# Patient Record
Sex: FEMALE | Race: BLACK OR AFRICAN AMERICAN | Employment: UNEMPLOYED | URBAN - METROPOLITAN AREA
[De-identification: names, ages, dates, MRNs, and addresses within clinical notes are randomized per-mention and may not be internally consistent; named-entity substitution may affect disease eponyms.]

---

## 2017-02-22 ENCOUNTER — HOSPITAL ENCOUNTER (OUTPATIENT)
Dept: GENERAL RADIOLOGY | Age: 82
Discharge: HOME | End: 2017-02-22
Payer: MEDICARE

## 2017-02-22 DIAGNOSIS — K59.00 CONSTIPATION: ICD-10-CM

## 2017-02-22 DIAGNOSIS — K56.609 INTESTINAL OBSTRUCTION (HCC): ICD-10-CM

## 2017-02-22 PROCEDURE — 74020 XR ABD FLAT/ ERECT: CPT

## 2017-03-27 ENCOUNTER — HOSPITAL ENCOUNTER (OUTPATIENT)
Dept: CT IMAGING | Age: 82
Discharge: HOME | End: 2017-03-27
Attending: FAMILY MEDICINE
Payer: MEDICARE

## 2017-03-27 DIAGNOSIS — R63.4 WEIGHT LOSS: ICD-10-CM

## 2017-03-27 DIAGNOSIS — R10.11 ABDOMINAL PAIN, RIGHT UPPER QUADRANT: ICD-10-CM

## 2017-03-27 LAB — CREAT UR-MCNC: 1.1 MG/DL (ref 0.6–1.3)

## 2017-03-27 PROCEDURE — 74011636320 HC RX REV CODE- 636/320

## 2017-03-27 PROCEDURE — 74177 CT ABD & PELVIS W/CONTRAST: CPT

## 2017-03-27 PROCEDURE — 82565 ASSAY OF CREATININE: CPT

## 2017-03-27 RX ADMIN — IOPAMIDOL 80 ML: 612 INJECTION, SOLUTION INTRAVENOUS at 15:00

## 2017-04-05 ENCOUNTER — HOSPITAL ENCOUNTER (OUTPATIENT)
Age: 82
Discharge: HOME | End: 2017-04-05
Attending: FAMILY MEDICINE
Payer: MEDICARE

## 2017-04-05 DIAGNOSIS — R93.5 ABNORMAL CT OF THE ABDOMEN: ICD-10-CM

## 2017-04-05 LAB — CREAT UR-MCNC: 1.5 MG/DL (ref 0.6–1.3)

## 2017-04-05 PROCEDURE — 74183 MRI ABD W/O CNTR FLWD CNTR: CPT

## 2017-04-05 PROCEDURE — A9585 GADOBUTROL INJECTION: HCPCS

## 2017-04-05 PROCEDURE — 74011250636 HC RX REV CODE- 250/636

## 2017-04-05 PROCEDURE — 82565 ASSAY OF CREATININE: CPT

## 2017-04-05 RX ADMIN — GADOBUTROL 10 ML: 604.72 INJECTION INTRAVENOUS at 15:00

## 2017-05-31 ENCOUNTER — HOSPITAL ENCOUNTER (OUTPATIENT)
Age: 82
Setting detail: OBSERVATION
Discharge: HOSPICE | End: 2017-06-02
Attending: EMERGENCY MEDICINE | Admitting: INTERNAL MEDICINE
Payer: MEDICARE

## 2017-05-31 ENCOUNTER — APPOINTMENT (OUTPATIENT)
Dept: GENERAL RADIOLOGY | Age: 82
End: 2017-05-31
Attending: EMERGENCY MEDICINE
Payer: MEDICARE

## 2017-05-31 DIAGNOSIS — E86.0 DEHYDRATION: Primary | ICD-10-CM

## 2017-05-31 DIAGNOSIS — R53.81 DEBILITY: ICD-10-CM

## 2017-05-31 DIAGNOSIS — C79.9 METASTATIC CARCINOMA (HCC): ICD-10-CM

## 2017-05-31 DIAGNOSIS — R63.4 WEIGHT LOSS: ICD-10-CM

## 2017-05-31 DIAGNOSIS — C79.9 ADENOCARCINOMA, METASTATIC (HCC): ICD-10-CM

## 2017-05-31 DIAGNOSIS — R41.82 ALTERED MENTAL STATUS, UNSPECIFIED: ICD-10-CM

## 2017-05-31 DIAGNOSIS — R62.7 FAILURE TO THRIVE IN ADULT: ICD-10-CM

## 2017-05-31 LAB
ALBUMIN SERPL BCP-MCNC: 3.2 G/DL (ref 3.4–5)
ALBUMIN/GLOB SERPL: 0.6 {RATIO} (ref 0.8–1.7)
ALP SERPL-CCNC: 98 U/L (ref 45–117)
ALT SERPL-CCNC: 10 U/L (ref 13–56)
ANION GAP BLD CALC-SCNC: 9 MMOL/L (ref 3–18)
AST SERPL W P-5'-P-CCNC: 23 U/L (ref 15–37)
BASOPHILS # BLD AUTO: 0 K/UL (ref 0–0.1)
BASOPHILS # BLD: 0 % (ref 0–2)
BILIRUB DIRECT SERPL-MCNC: 0.3 MG/DL (ref 0–0.2)
BILIRUB SERPL-MCNC: 0.6 MG/DL (ref 0.2–1)
BUN SERPL-MCNC: 36 MG/DL (ref 7–18)
BUN/CREAT SERPL: 27 (ref 12–20)
CALCIUM SERPL-MCNC: 10.9 MG/DL (ref 8.5–10.1)
CHLORIDE SERPL-SCNC: 99 MMOL/L (ref 100–108)
CO2 SERPL-SCNC: 27 MMOL/L (ref 21–32)
CREAT SERPL-MCNC: 1.32 MG/DL (ref 0.6–1.3)
DIFFERENTIAL METHOD BLD: ABNORMAL
EOSINOPHIL # BLD: 0 K/UL (ref 0–0.4)
EOSINOPHIL NFR BLD: 0 % (ref 0–5)
ERYTHROCYTE [DISTWIDTH] IN BLOOD BY AUTOMATED COUNT: 17.4 % (ref 11.6–14.5)
GLOBULIN SER CALC-MCNC: 5.3 G/DL (ref 2–4)
GLUCOSE SERPL-MCNC: 95 MG/DL (ref 74–99)
HCT VFR BLD AUTO: 31.9 % (ref 35–45)
HGB BLD-MCNC: 11.1 G/DL (ref 12–16)
INR PPP: 1.3 (ref 0.8–1.2)
LIPASE SERPL-CCNC: 111 U/L (ref 73–393)
LYMPHOCYTES # BLD AUTO: 6 % (ref 21–52)
LYMPHOCYTES # BLD: 0.8 K/UL (ref 0.9–3.6)
MAGNESIUM SERPL-MCNC: 2.4 MG/DL (ref 1.6–2.6)
MCH RBC QN AUTO: 24.4 PG (ref 24–34)
MCHC RBC AUTO-ENTMCNC: 34.8 G/DL (ref 31–37)
MCV RBC AUTO: 70.1 FL (ref 74–97)
MONOCYTES # BLD: 0.8 K/UL (ref 0.05–1.2)
MONOCYTES NFR BLD AUTO: 6 % (ref 3–10)
NEUTS SEG # BLD: 11.7 K/UL (ref 1.8–8)
NEUTS SEG NFR BLD AUTO: 88 % (ref 40–73)
PLATELET # BLD AUTO: 108 K/UL (ref 135–420)
PLATELET COMMENTS,PCOM: ABNORMAL
POTASSIUM SERPL-SCNC: 4.8 MMOL/L (ref 3.5–5.5)
PROT SERPL-MCNC: 8.5 G/DL (ref 6.4–8.2)
PROTHROMBIN TIME: 15.9 SEC (ref 11.5–15.2)
RBC # BLD AUTO: 4.55 M/UL (ref 4.2–5.3)
RBC MORPH BLD: ABNORMAL
RBC MORPH BLD: ABNORMAL
SODIUM SERPL-SCNC: 135 MMOL/L (ref 136–145)
WBC # BLD AUTO: 13.3 K/UL (ref 4.6–13.2)

## 2017-05-31 PROCEDURE — 80076 HEPATIC FUNCTION PANEL: CPT | Performed by: EMERGENCY MEDICINE

## 2017-05-31 PROCEDURE — 83690 ASSAY OF LIPASE: CPT | Performed by: EMERGENCY MEDICINE

## 2017-05-31 PROCEDURE — 74011250636 HC RX REV CODE- 250/636: Performed by: EMERGENCY MEDICINE

## 2017-05-31 PROCEDURE — 82728 ASSAY OF FERRITIN: CPT | Performed by: INTERNAL MEDICINE

## 2017-05-31 PROCEDURE — 85025 COMPLETE CBC W/AUTO DIFF WBC: CPT | Performed by: EMERGENCY MEDICINE

## 2017-05-31 PROCEDURE — 85610 PROTHROMBIN TIME: CPT | Performed by: EMERGENCY MEDICINE

## 2017-05-31 PROCEDURE — 71010 XR CHEST PORT: CPT

## 2017-05-31 PROCEDURE — 84443 ASSAY THYROID STIM HORMONE: CPT | Performed by: INTERNAL MEDICINE

## 2017-05-31 PROCEDURE — 80048 BASIC METABOLIC PNL TOTAL CA: CPT | Performed by: EMERGENCY MEDICINE

## 2017-05-31 PROCEDURE — 93005 ELECTROCARDIOGRAM TRACING: CPT

## 2017-05-31 PROCEDURE — 82607 VITAMIN B-12: CPT | Performed by: INTERNAL MEDICINE

## 2017-05-31 PROCEDURE — 83735 ASSAY OF MAGNESIUM: CPT | Performed by: EMERGENCY MEDICINE

## 2017-05-31 PROCEDURE — 82746 ASSAY OF FOLIC ACID SERUM: CPT | Performed by: INTERNAL MEDICINE

## 2017-05-31 PROCEDURE — 83540 ASSAY OF IRON: CPT | Performed by: INTERNAL MEDICINE

## 2017-05-31 PROCEDURE — 99284 EMERGENCY DEPT VISIT MOD MDM: CPT

## 2017-05-31 RX ORDER — SODIUM CHLORIDE 9 MG/ML
150 INJECTION, SOLUTION INTRAVENOUS CONTINUOUS
Status: DISCONTINUED | OUTPATIENT
Start: 2017-05-31 | End: 2017-06-01

## 2017-05-31 RX ADMIN — SODIUM CHLORIDE 150 ML/HR: 900 INJECTION, SOLUTION INTRAVENOUS at 23:20

## 2017-05-31 NOTE — IP AVS SNAPSHOT
Rachell Sveta 
 
 
 [address] Patient: Tenzin Mahmood MRN: [id number] FQX:[id number]/[id number] You are allergic to the following No active allergies Recent Documentation Height Weight BMI Smoking Status 1.676 m 59.9 kg 21.31 kg/m2 Never Smoker Emergency Contacts Name Discharge Info Relation Home Work Mobile Mohini Hernandez (Grandson/Primary Family Contact)  Other Relative [6]   [phone] González Hernandez DECLINED CAREGIVER [4] Spouse [3] [phone] About your hospitalization You were admitted on:  June 1, 2017 You last received care in the:  [facility] [address] [facility] You were discharged on:  June 2, 2017 Unit phone number:  [phone] Why you were hospitalized Your primary diagnosis was: Failure To Thrive In Adult Your diagnoses also included:  Weight Loss, Adenocarcinoma, Metastatic (Hcc), Dehydration, Htn (Hypertension), Hld (Hyperlipidemia), Josue (Acute Kidney Injury) (Hcc), Dementia, Nausea & Vomiting, Altered Mental Status, Unspecified, Debility Providers Seen During Your Hospitalizations Provider Role Specialty Primary office phone Blas Logan MD Attending Provider Emergency Medicine [phone] Re Torres MD Attending Provider Internal Medicine [phone] Your Primary Care Physician (PCP) Primary Care Physician Office Phone Office Fax Kilo Stevie [phone] Follow-up Information Follow up With Details Comments Contact Info Luiz Cannon MD On 6/7/2017 @11:30 [address] [facility] [address] 
[phone] Current Discharge Medication List  
  
START taking these medications Dose & Instructions Dispensing Information Comments Morning Noon Evening Bedtime LORazepam 2 mg/mL concentrated solution Commonly known as:  LORazepam Intensol Your last dose was: Your next dose is:    
   
   
 Dose:  1 mg Take 0.5 mL by mouth every four (4) hours as needed for Agitation or Anxiety (oral or sublingual). Max Daily Amount: 6 mg. Quantity:  30 mL Refills:  0  
     
   
   
   
  
 morphine 100 mg/5 mL (20 mg/mL) concentrated solution Commonly known as:  Mariana Coreas Your last dose was: Your next dose is:    
   
   
 Dose:  5 mg Take 0.25 mL by mouth every two (2) hours as needed for Pain (pain or shortness of breath). Max Daily Amount: 60 mg.  
 Quantity:  30 mL Refills:  0  
     
   
   
   
  
 ondansetron 4 mg disintegrating tablet Commonly known as:  ZOFRAN ODT Your last dose was: Your next dose is:    
   
   
 Dose:  4 mg Take 1 Tab by mouth every six (6) hours as needed for Nausea. Quantity:  30 Tab Refills:  0  
     
   
   
   
  
 therapeutic multivitamin tablet Commonly known as:  [facility] Your last dose was: Your next dose is:    
   
   
 Dose:  1 Tab Take 1 Tab by mouth daily. Quantity:  30 Tab Refills:  0 STOP taking these medications CRESTOR 10 mg tablet Generic drug:  rosuvastatin MICARDIS 20 mg tablet Generic drug:  telmisartan Where to Get Your Medications Information on where to get these meds will be given to you by the nurse or doctor. ! Ask your nurse or doctor about these medications LORazepam 2 mg/mL concentrated solution  
 morphine 100 mg/5 mL (20 mg/mL) concentrated solution  
 ondansetron 4 mg disintegrating tablet  
 therapeutic multivitamin tablet Discharge Instructions Discharge Instructions Patient: Halima Quinn MRN: [id number]  CSN: [id number] YOB: 1935  Age: 80 y.o. Sex: female DOA: 5/31/2017 LOS:  LOS: 0 days   Discharge Date: DIET:  Regular Diet ACTIVITY: Activity as tolerated Home health care for hospice care ADDITIONAL INFORMATION: If you experience any of the following symptoms but not limited to Fever, chills, nausea, vomiting, diarrhea, change in mentation, falling, bleeding, shortness of breath, chest pain, please call your primary care physician or return to the emergency room if you cannot get hold of your doctor:  
 
FOLLOW UP CARE: 
Dr. Candace Schaffer MD as needed. Aleks Dee MD 
6/2/2017 11:52 AM 
 
 
 
 
 
Discharge Orders None [facility] Announcement We are excited to announce that we are making your provider's discharge notes available to you in [facility]. You will see these notes when they are completed and signed by the physician that discharged you from your recent hospital stay. If you have any questions or concerns about any information you see in [facility], please call the Health Information Department where you were seen or reach out to your Primary Care Provider for more information about your plan of care. Introducing [facility]! [employer] introduces [employer] [facility] patient portal. Now you can access parts of your medical record, email your doctor's office, and request medication refills online. 1. In your internet browser, go to [email or web address] 2. Click on the First Time User? Click Here link in the Sign In box. You will see the New Member Sign Up page. 3. Enter your [facility] Access Code exactly as it appears below. You will not need to use this code after youve completed the sign-up process. If you do not sign up before the expiration date, you must request a new code. · [facility] Access Code: [email or web address] Expires: 8/21/2017  4:11 PM 
 
4. Enter the last four digits of your Social Security Number (xxxx) and Date of Birth (mm/dd/yyyy) as indicated and click Submit. You will be taken to the next sign-up page. 5. Create a [facility] ID.  This will be your [facility] login ID and cannot be changed, so think of one that is secure and easy to remember. 6. Create a [employer] password. You can change your password at any time. 7. Enter your Password Reset Question and Answer. This can be used at a later time if you forget your password. 8. Enter your e-mail address. You will receive e-mail notification when new information is available in [address] 9. Click Sign Up. You can now view and download portions of your medical record. 10. Click the Download Summary menu link to download a portable copy of your medical information. If you have questions, please visit the Frequently Asked Questions section of the [employer] website. Remember, [employer] is NOT to be used for urgent needs. For medical emergencies, dial 911. Now available from your iPhone and Android! General Information Please provide this summary of care documentation to your next provider. Patient Signature:  ____________________________________________________________ Date:  ____________________________________________________________  
  
[facility] Provider Signature:  ____________________________________________________________ Date:  ____________________________________________________________

## 2017-05-31 NOTE — IP AVS SNAPSHOT
Current Discharge Medication List  
  
START taking these medications Dose & Instructions Dispensing Information Comments Morning Noon Evening Bedtime LORazepam 2 mg/mL concentrated solution Commonly known as:  LORazepam Intensol Your last dose was: Your next dose is:    
   
   
 Dose:  1 mg Take 0.5 mL by mouth every four (4) hours as needed for Agitation or Anxiety (oral or sublingual). Max Daily Amount: 6 mg. Quantity:  30 mL Refills:  0  
     
   
   
   
  
 morphine 100 mg/5 mL (20 mg/mL) concentrated solution Commonly known as:  Jaspal Magallon Your last dose was: Your next dose is:    
   
   
 Dose:  5 mg Take 0.25 mL by mouth every two (2) hours as needed for Pain (pain or shortness of breath). Max Daily Amount: 60 mg.  
 Quantity:  30 mL Refills:  0  
     
   
   
   
  
 ondansetron 4 mg disintegrating tablet Commonly known as:  ZOFRAN ODT Your last dose was: Your next dose is:    
   
   
 Dose:  4 mg Take 1 Tab by mouth every six (6) hours as needed for Nausea. Quantity:  30 Tab Refills:  0  
     
   
   
   
  
 therapeutic multivitamin tablet Commonly known as:  [facility] Your last dose was: Your next dose is:    
   
   
 Dose:  1 Tab Take 1 Tab by mouth daily. Quantity:  30 Tab Refills:  0 STOP taking these medications CRESTOR 10 mg tablet Generic drug:  rosuvastatin MICARDIS 20 mg tablet Generic drug:  telmisartan Where to Get Your Medications Information on where to get these meds will be given to you by the nurse or doctor. ! Ask your nurse or doctor about these medications LORazepam 2 mg/mL concentrated solution  
 morphine 100 mg/5 mL (20 mg/mL) concentrated solution  
 ondansetron 4 mg disintegrating tablet  
 therapeutic multivitamin tablet

## 2017-05-31 NOTE — Clinical Note
Patient Class[de-identified] Observation [951] Type of Bed: Medical [8] Reason for Observation: Dehydration, metastatic carcinoma, unknown primary Admitting Physician: Arnulfo Spain [[id number]] Attending Physician: Arnulfo Spain [[id number]] Diagnosis: dehydration.

## 2017-05-31 NOTE — ED NOTES
PT resting comfortably on stretcher with family members at bedside. Per pt she does not have any complaints at this time and feels ok. Will continue to monitor.

## 2017-05-31 NOTE — ED TRIAGE NOTES
Pt arrives to ED via EMS. Per EMS, pt has been at home in bed x3 weeks with poor eating habits. Pt was to be set up on hospice care today and hospice requested that pt be transported here for evaluation.  is at bedside.

## 2017-06-01 PROBLEM — N17.9 AKI (ACUTE KIDNEY INJURY) (HCC): Status: ACTIVE | Noted: 2017-06-01

## 2017-06-01 PROBLEM — C79.9 ADENOCARCINOMA, METASTATIC (HCC): Status: ACTIVE | Noted: 2017-06-01

## 2017-06-01 PROBLEM — R63.4 WEIGHT LOSS: Status: ACTIVE | Noted: 2017-06-01

## 2017-06-01 PROBLEM — E78.5 HLD (HYPERLIPIDEMIA): Status: ACTIVE | Noted: 2017-06-01

## 2017-06-01 PROBLEM — R11.2 NAUSEA & VOMITING: Status: ACTIVE | Noted: 2017-06-01

## 2017-06-01 PROBLEM — R62.7 FAILURE TO THRIVE IN ADULT: Status: ACTIVE | Noted: 2017-06-01

## 2017-06-01 PROBLEM — F03.90 DEMENTIA (HCC): Status: ACTIVE | Noted: 2017-06-01

## 2017-06-01 PROBLEM — E86.0 DEHYDRATION: Status: ACTIVE | Noted: 2017-06-01

## 2017-06-01 PROBLEM — I10 HTN (HYPERTENSION): Status: ACTIVE | Noted: 2017-06-01

## 2017-06-01 LAB
ALBUMIN SERPL BCP-MCNC: 2.6 G/DL (ref 3.4–5)
ALBUMIN/GLOB SERPL: 0.6 {RATIO} (ref 0.8–1.7)
ALP SERPL-CCNC: 78 U/L (ref 45–117)
ALT SERPL-CCNC: 9 U/L (ref 13–56)
ANION GAP BLD CALC-SCNC: 5 MMOL/L (ref 3–18)
APPEARANCE UR: CLEAR
AST SERPL W P-5'-P-CCNC: 20 U/L (ref 15–37)
ATRIAL RATE: 103 BPM
BACTERIA URNS QL MICRO: NEGATIVE /HPF
BILIRUB SERPL-MCNC: 0.7 MG/DL (ref 0.2–1)
BILIRUB UR QL: ABNORMAL
BUN SERPL-MCNC: 36 MG/DL (ref 7–18)
BUN/CREAT SERPL: 26 (ref 12–20)
CALCIUM SERPL-MCNC: 10.1 MG/DL (ref 8.5–10.1)
CALCULATED P AXIS, ECG09: 7 DEGREES
CALCULATED R AXIS, ECG10: -41 DEGREES
CALCULATED T AXIS, ECG11: 47 DEGREES
CHLORIDE SERPL-SCNC: 102 MMOL/L (ref 100–108)
CO2 SERPL-SCNC: 30 MMOL/L (ref 21–32)
COLOR UR: ABNORMAL
CREAT SERPL-MCNC: 1.37 MG/DL (ref 0.6–1.3)
DIAGNOSIS, 93000: NORMAL
EPITH CASTS URNS QL MICRO: NORMAL /LPF (ref 0–5)
FERRITIN SERPL-MCNC: 1284 NG/ML (ref 8–388)
FOLATE SERPL-MCNC: 6.8 NG/ML (ref 3.1–17.5)
GLOBULIN SER CALC-MCNC: 4.4 G/DL (ref 2–4)
GLUCOSE BLD STRIP.AUTO-MCNC: 103 MG/DL (ref 70–110)
GLUCOSE BLD STRIP.AUTO-MCNC: 103 MG/DL (ref 70–110)
GLUCOSE BLD STRIP.AUTO-MCNC: 109 MG/DL (ref 70–110)
GLUCOSE BLD STRIP.AUTO-MCNC: 121 MG/DL (ref 70–110)
GLUCOSE SERPL-MCNC: 134 MG/DL (ref 74–99)
GLUCOSE UR STRIP.AUTO-MCNC: NEGATIVE MG/DL
HGB UR QL STRIP: NEGATIVE
HYALINE CASTS URNS QL MICRO: NORMAL /LPF (ref 0–2)
IRON SATN MFR SERPL: 15 %
IRON SERPL-MCNC: 30 UG/DL (ref 50–175)
KETONES UR QL STRIP.AUTO: 15 MG/DL
LEUKOCYTE ESTERASE UR QL STRIP.AUTO: ABNORMAL
NITRITE UR QL STRIP.AUTO: NEGATIVE
P-R INTERVAL, ECG05: 146 MS
PH UR STRIP: 5 [PH] (ref 5–8)
POTASSIUM SERPL-SCNC: 4.4 MMOL/L (ref 3.5–5.5)
PROT SERPL-MCNC: 7 G/DL (ref 6.4–8.2)
PROT UR STRIP-MCNC: 30 MG/DL
Q-T INTERVAL, ECG07: 328 MS
QRS DURATION, ECG06: 78 MS
QTC CALCULATION (BEZET), ECG08: 429 MS
RBC #/AREA URNS HPF: NEGATIVE /HPF (ref 0–5)
SODIUM SERPL-SCNC: 137 MMOL/L (ref 136–145)
SP GR UR REFRACTOMETRY: 1.03 (ref 1–1.03)
TIBC SERPL-MCNC: 201 UG/DL (ref 250–450)
TSH SERPL DL<=0.05 MIU/L-ACNC: 0.69 UIU/ML (ref 0.36–3.74)
UROBILINOGEN UR QL STRIP.AUTO: 1 EU/DL (ref 0.2–1)
VENTRICULAR RATE, ECG03: 103 BPM
VIT B12 SERPL-MCNC: 446 PG/ML (ref 211–911)
WBC URNS QL MICRO: NORMAL /HPF (ref 0–4)

## 2017-06-01 PROCEDURE — 97530 THERAPEUTIC ACTIVITIES: CPT

## 2017-06-01 PROCEDURE — 99218 HC RM OBSERVATION: CPT

## 2017-06-01 PROCEDURE — 77030033263 HC DRSG MEPILEX 16-48IN BORD MOLN -B

## 2017-06-01 PROCEDURE — G8979 MOBILITY GOAL STATUS: HCPCS

## 2017-06-01 PROCEDURE — 97166 OT EVAL MOD COMPLEX 45 MIN: CPT

## 2017-06-01 PROCEDURE — G8988 SELF CARE GOAL STATUS: HCPCS

## 2017-06-01 PROCEDURE — 76450000000

## 2017-06-01 PROCEDURE — 96372 THER/PROPH/DIAG INJ SC/IM: CPT

## 2017-06-01 PROCEDURE — 96361 HYDRATE IV INFUSION ADD-ON: CPT

## 2017-06-01 PROCEDURE — 74011250636 HC RX REV CODE- 250/636: Performed by: HOSPITALIST

## 2017-06-01 PROCEDURE — 36415 COLL VENOUS BLD VENIPUNCTURE: CPT | Performed by: INTERNAL MEDICINE

## 2017-06-01 PROCEDURE — G8987 SELF CARE CURRENT STATUS: HCPCS

## 2017-06-01 PROCEDURE — 74011000258 HC RX REV CODE- 258: Performed by: INTERNAL MEDICINE

## 2017-06-01 PROCEDURE — 97162 PT EVAL MOD COMPLEX 30 MIN: CPT

## 2017-06-01 PROCEDURE — 80053 COMPREHEN METABOLIC PANEL: CPT | Performed by: INTERNAL MEDICINE

## 2017-06-01 PROCEDURE — 96365 THER/PROPH/DIAG IV INF INIT: CPT

## 2017-06-01 PROCEDURE — 74011250636 HC RX REV CODE- 250/636: Performed by: INTERNAL MEDICINE

## 2017-06-01 PROCEDURE — G8978 MOBILITY CURRENT STATUS: HCPCS

## 2017-06-01 PROCEDURE — 82962 GLUCOSE BLOOD TEST: CPT

## 2017-06-01 PROCEDURE — 74011000258 HC RX REV CODE- 258: Performed by: HOSPITALIST

## 2017-06-01 PROCEDURE — 81001 URINALYSIS AUTO W/SCOPE: CPT | Performed by: EMERGENCY MEDICINE

## 2017-06-01 RX ORDER — ACETAMINOPHEN 325 MG/1
650 TABLET ORAL
Status: DISCONTINUED | OUTPATIENT
Start: 2017-06-01 | End: 2017-06-02 | Stop reason: HOSPADM

## 2017-06-01 RX ORDER — THERA TABS 400 MCG
1 TAB ORAL DAILY
Status: DISCONTINUED | OUTPATIENT
Start: 2017-06-02 | End: 2017-06-02 | Stop reason: HOSPADM

## 2017-06-01 RX ORDER — DEXTROSE MONOHYDRATE AND SODIUM CHLORIDE 5; .45 G/100ML; G/100ML
100 INJECTION, SOLUTION INTRAVENOUS CONTINUOUS
Status: DISCONTINUED | OUTPATIENT
Start: 2017-06-01 | End: 2017-06-02 | Stop reason: HOSPADM

## 2017-06-01 RX ORDER — HEPARIN SODIUM 5000 [USP'U]/ML
5000 INJECTION, SOLUTION INTRAVENOUS; SUBCUTANEOUS EVERY 8 HOURS
Status: DISCONTINUED | OUTPATIENT
Start: 2017-06-01 | End: 2017-06-02 | Stop reason: HOSPADM

## 2017-06-01 RX ORDER — ONDANSETRON 2 MG/ML
4 INJECTION INTRAMUSCULAR; INTRAVENOUS
Status: DISCONTINUED | OUTPATIENT
Start: 2017-06-01 | End: 2017-06-02 | Stop reason: HOSPADM

## 2017-06-01 RX ORDER — LORAZEPAM 2 MG/ML
1 CONCENTRATE ORAL
Qty: 30 ML | Refills: 0 | Status: SHIPPED | OUTPATIENT
Start: 2017-06-01

## 2017-06-01 RX ORDER — MORPHINE SULFATE 20 MG/ML
5 SOLUTION ORAL
Qty: 30 ML | Refills: 0 | Status: SHIPPED | OUTPATIENT
Start: 2017-06-01

## 2017-06-01 RX ADMIN — DEXTROSE MONOHYDRATE AND SODIUM CHLORIDE 100 ML/HR: 5; .45 INJECTION, SOLUTION INTRAVENOUS at 23:05

## 2017-06-01 RX ADMIN — DEXTROSE MONOHYDRATE AND SODIUM CHLORIDE 100 ML/HR: 5; .45 INJECTION, SOLUTION INTRAVENOUS at 02:43

## 2017-06-01 RX ADMIN — HEPARIN SODIUM 5000 UNITS: 5000 INJECTION, SOLUTION INTRAVENOUS; SUBCUTANEOUS at 02:44

## 2017-06-01 RX ADMIN — HEPARIN SODIUM 5000 UNITS: 5000 INJECTION, SOLUTION INTRAVENOUS; SUBCUTANEOUS at 12:32

## 2017-06-01 RX ADMIN — CEFTRIAXONE SODIUM 1 G: 1 INJECTION, POWDER, FOR SOLUTION INTRAMUSCULAR; INTRAVENOUS at 15:16

## 2017-06-01 NOTE — CONSULTS
Visit with patient - she denies any sort of pain or discomfort at this time. Patient is confused and not able to engage in conversations. Family relays conversation earlier today with hem/onc and ready to meet with hospice to discuss care at home. Discussed with family (including ) - explained resuscitation and with explanation of DDNR as allow natural death and further elaboration, he states \"that's what we want\". DNR entered to reflect and DDNR completed with . Hospice order placed here in discussion with family. Morphine and intensol scripts printed for d/c when appropriate. Full note to follow.

## 2017-06-01 NOTE — PROGRESS NOTES
Pt seen and examined  Pt sleeping, NAD  Labs, chart and vitals noted   Oncology and palliative care in put noted   UA abnormal, will start Abx  Called spouse and left message   D/w palliative care, plan for hospice   D/w CM

## 2017-06-01 NOTE — PROGRESS NOTES
PT order received. Chart reviewed. Pt currently has active bedrest order. Please increase activity level for max participation in PT. Will hold PT eval at this time and follow up at next available opportunity. Thank you.

## 2017-06-01 NOTE — PROGRESS NOTES
Care Management Interventions  PCP Verified by CM: Yes (DR. Joann Benjamin)  Palliative Care Consult (Criteria: CHF and RRAT>21): No  Reason for No Palliative Care Consult: Other (see comment) (NO ORDER)  Mode of Transport at Discharge: Other (see comment) (family will transport)  Transition of Care Consult (CM Consult): Discharge Planning  Physical Therapy Consult: Yes  Occupational Therapy Consult: Yes  Current Support Network: Lives with Spouse, Own Home, Family Lives Nearby  Confirm Follow Up Transport: Family  Plan discussed with Pt/Family/Caregiver: Yes (pt is agreeable with SNF placement or Home Health, this pt will require a three night overnght admission to qualify for SNF admission, which is this writer recommendationi)  Freedom of Choice Offered:  (pt request that this writer speak with spouse and family regarding placement if determined)  Discharge Location  Discharge Placement: Home with home health  Pt is a 80year old female in room 455, alone at this time. This pt was admitted due to Dehydration and failure to thrive. Pt is alert and oriented, this pt was admitted at this time under Observation status. Pt reports that she lives at home, with her spouse and children live in the area. This writer will speak with family regarding the next level of care. At this interview this pt needs assistance with all ADL. OBS/MOON signed by this pt and placed on the pt's  Inpatient chart.

## 2017-06-01 NOTE — PROGRESS NOTES
Problem: Mobility Impaired (Adult and Pediatric)  Goal: *Acute Goals and Plan of Care (Insert Text)  Physical Therapy Goals  Initiated 6/1/2017 and to be accomplished within 7 day(s)  1. Patient will roll side to side in bed with contact guard assist.   2. Patient will move from supine to sit and sit to supine with minimal assistance. 3. Patient will transfer from bed to chair and chair to bed with minimal assistance using the least restrictive device. 4. Patient will perform sit to stand with minimal assistance. 5. Patient will ambulate with moderate assistance for 10 feet with the least restrictive device. PHYSICAL THERAPY EVALUATION     Patient: Bertha Ibrahim (78 y.o. female)  Date: 6/1/2017  Primary Diagnosis: dehydration. Failure to thrive in adult        Precautions:   Fall, Skin      ASSESSMENT :  Based on the objective data described below, the patient presents with decreased strength B LE's, decreased activity tolerance, impaired balance sitting and standing, and impaired functional mobility to include bed mobility, transfers, and gait. Pt willing to participate in PT, but fatigued at end of session. Pt overall weak and requiring mod/max A for mobility at this time. Pt unable to take steps to sidestep at EOB without max A. Pt and family considering hospice services due to medical status, but would benefit from skilled PT to address strengthening, balance, bed mobility and safe transfers. Lives with , but he appears frail and would likely have difficulty assisting pt physically. Patient will benefit from skilled intervention to address the above impairments.   Patients rehabilitation potential is considered to be Fair  Factors which may influence rehabilitation potential include:   [ ]         None noted  [ ]         Mental ability/status  [X]         Medical condition  [ ]         Home/family situation and support systems  [ ]         Safety awareness  [ ]         Pain tolerance/management  [ ]         Other:        PLAN :  Recommendations and Planned Interventions:  [X]           Bed Mobility Training             [X]    Neuromuscular Re-Education  [X]           Transfer Training                   [ ]    Orthotic/Prosthetic Training  [X]           Gait Training                          [ ]    Modalities  [X]           Therapeutic Exercises          [ ]    Edema Management/Control  [X]           Therapeutic Activities            [X]    Patient and Family Training/Education  [ ]           Other (comment):     Frequency/Duration: Patient will be followed by physical therapy 1-2 times per day/4-7 days per week to address goals. Discharge Recommendations: SNF, or home health and 24 hr assistance  Further Equipment Recommendations for Discharge: bedside commode, hospital bed, rolling walker and wheelchair        G-CODES      Mobility  Current  CL= 60-79%   Goal  CK= 40-59%. The severity rating is based on the Level of Assistance required for Functional Mobility and ADLs.            G-CODES      Eval Complexity: History: MEDIUM  Complexity : 1-2 comorbidities / personal factors will impact the outcome/ POC Exam:MEDIUM Complexity : 3 Standardized tests and measures addressing body structure, function, activity limitation and / or participation in recreation  Presentation: MEDIUM Complexity : Evolving with changing characteristics  Clinical Decision Making: medium complexity Overall Complexity:MEDIUM      SUBJECTIVE:   Patient stated I don't have any pain.   \"I'm weak. \" \"I ate just a little. \"      OBJECTIVE DATA SUMMARY:       Past Medical History:   Diagnosis Date    Hypertension      Ill-defined condition       elevated cholesterol     Past Surgical History:   Procedure Laterality Date    HX OTHER SURGICAL         left hip cyst removed     Prior Level of Function/Home Situation: pt reports being independent without AD for all mobility and driving, up until April of this year. Then gradually declined and was basically bed bound for the past 2 weeks PTA. Home Situation  Home Environment: Private residence  # Steps to Enter: 4  Rails to Enter: Yes  Hand Rails : Bilateral (wide apart)  Wheelchair Ramp: No  One/Two Story Residence: One story  Living Alone: No  Support Systems: Spouse/Significant Other/Partner, Family member(s) (family lives nearby)  Patient Expects to be Discharged to[de-identified] Private residence  Current DME Used/Available at Home: None     Critical Behavior:  Neurologic State: Alert  Orientation Level: Oriented to person;Oriented to place;Oriented to situation;Disoriented to time  Cognition: Follows commands;Decreased attention/concentration  Safety/Judgement: Fall prevention  Psychosocial  Patient Behaviors: Calm; Cooperative  Family  Behaviors: Calm;Supportive; Appropriate for situation  Family  Behaviors: Calm;Supportive; Appropriate for situation     Strength:    Strength: Generally decreased, functional (B LE's grossly 3+/5 )     Tone & Sensation:   Tone: Normal (B LE's)      Range Of Motion:  AROM: Generally decreased, functional (B LE's )     Functional Mobility:  Bed Mobility:  Rolling: Minimum assistance  Supine to Sit: Maximum assistance  Sit to Supine: Moderate assistance  Scooting: Moderate assistance     Transfers:  Sit to Stand: Moderate assistance;Maximum assistance (max A to initiate, then mod A once LE's able to kick in)  Stand to Sit: Moderate assistance     Balance:   Sitting: Impaired; Without support  Sitting - Static: Fair (occasional)  Sitting - Dynamic: Poor (constant support)  Standing: Impaired; With support  Standing - Static: Poor  Standing - Dynamic : Poor     Ambulation/Gait Training:  Distance (ft): 2 Feet (ft) (2 small side-steps at EOB going to the left)  Assistive Device:  (no device, support from PT)  Ambulation - Level of Assistance: Maximum assistance  Gait Description (WDL): Exceptions to WDL  Gait Abnormalities: Decreased step clearance (PT assisting to slide foot to L to sidestep)  Base of Support: Narrowed  Speed/Mirna: Slow  Step Length: Left shortened;Right shortened     Pain:  Pt reports 0/10 pain or discomfort prior to treatment. Pt reports 0/10 pain or discomfort post treatment. Activity Tolerance:   Fair, pt without c/o's, but fatigued at end of session. Overall, generally weakened and deconditioned. Please refer to the flowsheet for vital signs taken during this treatment. After treatment:   [ ]         Patient left in no apparent distress sitting up in chair  [X]         Patient left in no apparent distress in bed  [X]         Call bell left within reach  [ ]         Nursing notified  [X]         Family present in room  [ ]         Bed alarm activated      COMMUNICATION/EDUCATION:   [X]         Fall prevention education was provided and the patient/caregiver indicated understanding. [X]         Patient/family have participated as able in goal setting and plan of care. [X]         Patient/family agree to work toward stated goals and plan of care. [ ]         Patient understands intent and goals of therapy, but is neutral about his/her participation. [ ]         Patient is unable to participate in goal setting and plan of care.      Thank you for this referral.  Sammi Aguilera, PT   Time Calculation: 25 mins

## 2017-06-01 NOTE — PROGRESS NOTES
Problem: Self Care Deficits Care Plan (Adult)  Goal: *Acute Goals and Plan of Care (Insert Text)  Occupational Therapy Goals  Initiated 6/1/2017 within 7 day(s). 1. Patient will perform grooming with supervision/set-up. 2. Patient will perform upper body dressing with supervision/set-up. 3. Patient will perform lower body dressing with moderate assistance . 4. Patient will perform toilet transfers with moderate assistance . 5. Patient will perform all aspects of toileting with moderate assistance . 6. Patient will participate in upper extremity therapeutic exercise/activities with supervision/set-up for 8 minutes to increase strength/endurance for ADLs. Outcome: Progressing Towards Goal  OCCUPATIONAL THERAPY EVALUATION     Patient: Alireza Hodges (2 y.o. female)  Date: 6/1/2017  Primary Diagnosis: dehydration. Failure to thrive in adult        Precautions:   Fall, Skin      ASSESSMENT :  Based on the objective data described below, the patient presents with decreased ADLs, decreased functional mobility and muscle weakness with slow decline in function for past two months per family. Max assist given for LB ADLs and functional standing/transfers. Patient/family to meet with hospice. Will work on increasing patient's functional independence at this time per family request.  Patient has a supportive  at home who is unable to provide more than minimal physical assistance. Recommend continued therapy at SNF or if returning home, 24/7 aide for care. Patient will benefit from skilled intervention to address the above impairments.   Patients rehabilitation potential is considered to be Good  Factors which may influence rehabilitation potential include:   [ ]             None noted  [ ]             Mental ability/status  [X]             Medical condition  [ ]             Home/family situation and support systems  [ ]             Safety awareness  [ ]             Pain tolerance/management  [ ] Other: PLAN :  Recommendations and Planned Interventions:  [X]               Self Care Training                  [X]        Therapeutic Activities  [X]               Functional Mobility Training    [ ]        Cognitive Retraining  [X]               Therapeutic Exercises           [X]        Endurance Activities  [X]               Balance Training                   [ ]        Neuromuscular Re-Education  [ ]               Visual/Perceptual Training     [X]   Home Safety Training  [X]               Patient Education                 [X]        Family Training/Education  [ ]               Other (comment):     Frequency/Duration: Patient will be followed by occupational therapy 1-2 times per day/4-7 days per week to address goals. Discharge Recommendations: Skilled Nursing Facility  Further Equipment Recommendations for Discharge: Patient's  states he has DME at home for bathroom safety. PATIENT COMPLEXITY      Eval Complexity: History: MEDIUM Complexity : Expanded review of history including physical, cognitive and psychosocial  history ; Examination: MEDIUM Complexity : 3-5 performance deficits relating to physical, cognitive , or psychosocial skils that result in activity limitations and / or participation restrictions; Decision Making:MEDIUM Complexity : Patient may present with comorbidities that affect occupational performnce. Miniml to moderate modification of tasks or assistance (eg, physical or verbal ) with assesment(s) is necessary to enable patient to complete evaluation  Assessment: Moderate Complexity       G-CODES:      Self Care  Current  CL= 60-79%   Goal  CK= 40-59%. The severity rating is based on the Level of Assistance required for Functional Mobility and ADLs. SUBJECTIVE:   Patient stated I'm tired.       OBJECTIVE DATA SUMMARY:       Past Medical History:   Diagnosis Date    Hypertension      Ill-defined condition       elevated cholesterol     Past Surgical History:   Procedure Laterality Date    HX OTHER SURGICAL         left hip cyst removed     Prior Level of Function/Home Situation: Pt required min assist with basic self care tasks and functional mobility for two months PTA. Home Situation  Home Environment: Private residence  # Steps to Enter: 4  Rails to Enter: Yes  Hand Rails : Bilateral (wide apart)  Wheelchair Ramp: No  One/Two Story Residence: One story  Living Alone: No  Support Systems: Spouse/Significant Other/Partner, Family member(s) (family lives nearby)  Patient Expects to be Discharged to[de-identified] Private residence  Current DME Used/Available at Home: None  Tub or Shower Type: Tub/Shower combination  [X]  Right hand dominant          [ ]  Left hand dominant  Cognitive/Behavioral Status:  Neurologic State: Alert  Orientation Level: Oriented to place;Oriented to person;Oriented to situation  Cognition: Follows commands;Decreased attention/concentration  Safety/Judgement: Fall prevention     Skin: Intact on UEs     Edema: None noted in UEs     Vision/Perceptual:    Acuity: Within Defined Limits       Coordination:  Coordination: Within functional limits (B LE's)  Fine Motor Skills-Upper: Left Intact; Right Intact    Gross Motor Skills-Upper: Left Intact; Right Intact     Balance:  Sitting: Impaired; Without support  Sitting - Static: Fair (occasional)  Sitting - Dynamic: Poor (constant support)  Standing: Impaired; With support  Standing - Static: Poor  Standing - Dynamic : Poor     Strength:  Strength: Generally decreased, functional (UEs; 3/5 throughout)     Tone & Sensation:  Tone: Normal (UEs)     Range of Motion:  AROM: Generally decreased, functional (UEs)     Functional Mobility and Transfers for ADLs:  Bed Mobility:  Rolling: Minimum assistance  Supine to Sit: Maximum assistance  Sit to Supine: Moderate assistance  Scooting: Moderate assistance  Transfers:  Sit to Stand:  Moderate assistance;Maximum assistance (max A to initiate, then mod A once LE's able to kick in)              Toilet Transfer : Maximum assistance     ADL Assessment:  Feeding: Setup;Supervision     Oral Facial Hygiene/Grooming: Moderate assistance     Bathing: Maximum assistance     Upper Body Dressing: Moderate assistance     Lower Body Dressing: Maximum assistance     Toileting: Total assistance     Pain:  Pt reports 0/10 pain or discomfort prior to treatment. Pt reports 0/10 pain or discomfort post treatment. Activity Tolerance:   Good     Please refer to the flowsheet for vital signs taken during this treatment. After treatment:   [ ] Patient left in no apparent distress sitting up in chair  [X] Patient left in no apparent distress in bed  [X] Call bell left within reach  [ ] Nursing notified  [ ] Caregiver present  [ ] Bed alarm activated      COMMUNICATION/EDUCATION:   [X] Home safety education was provided and the patient/caregiver indicated understanding. [X] Patient/family have participated as able in goal setting and plan of care. [X] Patient/family agree to work toward stated goals and plan of care. [ ] Patient understands intent and goals of therapy, but is neutral about his/her participation. [ ] Patient is unable to participate in goal setting and plan of care.      Thank you for this referral.  Britt Copeland MS OTR/L  Time Calculation: 25 mins

## 2017-06-01 NOTE — H&P
Hospitalist Admission History and Physical    NAME:  Bertha Ibrahim   :   1935   MRN:   [id number]     PCP:  Radha Jenkins MD  Date/Time:  2017 12:14 AM  Subjective:   CHIEF COMPLAINT:  Failure to thrive     HISTORY OF PRESENT ILLNESS:     Mayi Dela Cruz is a 80 y.o. With medical hx of recent dx of metastatic adenocarcinoma with unknown primary comes to the hospital for evaluation of failure to thrive. Hx per family due to patients mental status. According to the family, patient has not been herself for past several months. They have noticed gradual decline in her as she has stopped eating much and has poor po intake. She was seen by her PCP several weeks ago who performed a CT scan which showed possible retroperitoneal mass/nodes. Patient underwent EUS and was found to have adenocarcinoma with mets and unknown origin. Patient follows up with Dr Rosenda Jean and per family she is scheduled for a chemo tomorrow. I am not sure if family knows the concept of Hospice therefore they are unable to provide me with any meaningful details but per ED records is under care of [address] [place] [address]? Also per  patient has been more bedbound at home and even when try to feed her favorite food, she wouldn't eat much of that. They have also noted she has been hallucinating for past few week and had told her outpatient physicians about it. They arent sure if patient has never had CT head/ MRI brain done. Because she continues to get weak, family isnt sure if they can provide around the clock help for her at home therefore brought her to the hospital for help. No other complaints at this time. Patient is FULL CODE per her . In the Ed she was noted to be dehydration with slightly elevated of her BUN/Cr. She had mild leukocytosis which was thought secondary to dehydration and anemia, likely from iron def.        Past Medical History:   Diagnosis Date    Hypertension     Ill-defined condition elevated cholesterol        Past Surgical History:   Procedure Laterality Date    HX OTHER SURGICAL      left hip cyst removed       Social History   Substance Use Topics    Smoking status: Never Smoker    Smokeless tobacco: Not on file    Alcohol use No        History reviewed. No pertinent family history. No Known Allergies     Prior to Admission Medications   Prescriptions Last Dose Informant Patient Reported? Taking? rosuvastatin (CRESTOR) 10 mg tablet   Yes No   Sig: Take 10 mg by mouth nightly. telmisartan (MICARDIS) 20 mg tablet   Yes No   Sig: Take 20 mg by mouth daily.       Facility-Administered Medications: None       REVIEW OF SYSTEMS:     [x] Unable to obtain  ROS due to  [x]mental status change  []sedated   []intubated   []Total of 12 systems reviewed as follows:  Constitutional:  negative fever, negative chills, negative weight loss  Eyes:   negative visual changes  ENT:   negative sore throat, tongue or lip swelling  Respiratory:  negative cough, negative dyspnea  Cards:   negative for chest pain, palpitations, lower extremity edema  GI:   negative for nausea, vomiting, diarrhea, and abdominal pain  Genitourinary: negative for frequency, dysuria  Integument:  negative for rash and pruritus  Hematologic:  negative for easy bruising and gum/nose bleeding  Musculoskel: negative for myalgias,  back pain and muscle weakness  Neurological:  negative for headaches, dizziness, vertigo  Behavl/Psych:  negative for feelings of anxiety, depression     Pertinent Positives include :     Objective:   VITALS:    Visit Vitals    /84 (BP 1 Location: Left arm, BP Patient Position: At rest)    Pulse 91    Temp 97.8 °F (36.6 °C)    Resp 19    Wt 56.2 kg (124 lb)    SpO2 99%    BMI 20.01 kg/m2     Temp (24hrs), Av.8 °F (36.6 °C), Min:97.8 °F (36.6 °C), Max:97.8 °F (36.6 °C)      PHYSICAL EXAM:   General:    NAD, frail cachetic appearing      Head:   Normocephalic, without obvious abnormality, atraumatic. Temporal wasting   Eyes:   Conjunctivae clear, anicteric sclerae. Pupils are equal  Nose:  Nares normal. No drainage or sinus tenderness. Throat:    Lips, mucosa, and tongue DRY. No Thrush  Neck:  Supple, symmetrical,  no adenopathy, thyroid: non tender    no carotid bruit and no JVD. Back:    Symmetric,  No CVA tenderness. Lungs:   Clear to auscultation bilaterally. No Wheezing or Rhonchi. No rales. Chest wall:  No tenderness or deformity. No Accessory muscle use. Heart:   Tachycardia Regular rate and rhythm,  no murmur, rub or gallop. Abdomen:   Soft, non-tender. Not distended. Bowel sounds normal. No masses  Extremities: Extremities normal, atraumatic, No cyanosis. No edema. No clubbing  Skin:     Texture, turgor normal. No rashes or lesions. Not Jaundiced  Lymph nodes: Cervical, supraclavicular normal.  Psych:  Poor insight   Neurologic: EOMs intact. No facial asymmetry. No aphasia or slurred speech. Normal   strength, Alert and oriented X 1       LAB DATA REVIEWED:    No results found for: [address] [place] [address]      05/31/17   2112   NA  135*   K  4.8   CL  99*   CO2  27   BUN  36*   CREA  1.32*   GLU  95   CA  10.9*   ALB  3.2*   WBC  13.3*   HGB  11.1*   HCT  31.9*   PLT  108*         IMAGING RESULTS:   []  I have personally reviewed the actual   []CXR  []CT scan    CXR:  CT :    Assessment/Plan:      Principal Problem:    Failure to thrive in adult (6/1/2017)    Active Problems:    Weight loss (6/1/2017)      [facility] (6/1/2017)      Dehydration (6/1/2017)      HTN (hypertension) (6/1/2017)      HLD (hyperlipidemia) (6/1/2017)      ARLEEN (acute kidney injury) ([facility] (6/1/2017)      Dementia (6/1/2017)      Nausea & vomiting (6/1/2017)       ___________________________________________________  PLAN:    Risk of deterioration:  []Low    [x]Moderate  []High    1. Failure to thrive   2. Unintentional rapid weight loss  3. Mild Dehydration   4.  Acute Kidney Injury; likely from dehdyration   5. Recent Dx of Metastatic Adenocarcinoma with unknown primary; follows Dr Jean Pierre Wong   6. HTN  7. HLD  8. Dementia   9. Nausea/Vomiting, improved   10. Anemia, unknown as there is no iron studies available but looks like iron def looking at the low MCV  FULL CODE per  who is next of kin    -admit for further care   -no signs of infection at this time, this is all likely from overall deterioration of her health from malignancy  -regular diet if tolerates, nutrition consult. IVF  -monitor Cr, check iron studies,ferritin, tsh, b12 and folate   -family interested in speaking with palliative care again   -consult Dr Jean Pierre Wong in the morning. She was suppose to be schedule for outpatient chemo tomorrow. Not sure how well she will take it given her nutrition status. Defer to Onc   -antiemetics if needed   -supportive care   -consult PT/OT, nutrition team and Social work. Family interest in long term care placement as they are unable to provide herwith around the clock attention.            Prophylaxis:  []Lovenox  []Coumadin  [x]Hep SQ  []SCDs  []H2B/PPI    Disposition:  [x]Home w/ Family   []HH PT,OT,RN   []SNF/LTC   []SAH/Rehab    Discussed Code Status:    [x]Full Code      []DNR     ___________________________________________________    Care Plan discussed with:    [x]Patient   []Family    []ED Care Manager  []ED Doc   []Specialist :    Total Time Coordinating Admission:  55    minutes    []Total Critical Care Time:     ___________________________________________________  Admitting Physician: Hang Moran MD

## 2017-06-01 NOTE — PROGRESS NOTES
OT order received and chart reviewed. Patient currently has an active bedrest order. Please discontinue bedrest order for full participation in skilled OT evaluation/treatment.   Thank you for the referral.  Canda Opitz, MS OTR/L

## 2017-06-01 NOTE — PROGRESS NOTES
[facility]: [phone] ([phone])  [facility]: [phone]   [facility]: [phone]    Patient Name: Shane Goldberg  YOB: 1935    Date of Initial Consult: 06/01/2017  Reason for Consult: Care Decisions  Requesting Provider: Poppy Woodard MD  Primary Care Physician: Manisha Grimes MD      SUMMARY:   Shane Goldberg is a 80y.o. year old with a past history of HTN and metastatic adenocarcinoma of unknown primary site, who was admitted on 5/31/2017 from home with a diagnosis of altered mental status and failure to thrive. Current medical issues leading to Palliative Medicine involvement include: care decisions. PALLIATIVE DIAGNOSES:   1. Adenocarcinoma, metastatic  2. Dehydration  3. Failure to thrive  4. Weight loss  5. Debility  6. Altered mental status     PLAN:   1. Visit with patient whom is confused, unable to participate in goals of care conversations. Patient denies any sort of pain or discomfort, however on revisit patient has bag for n/v at bedside. Continue zofran PRN, will write script for zofran ODT prior to discharge. 2. Family including , niece, son and additional family member ? Granddaughter at bedside. Family shares recently having conversation with Dr. Rio Kate and aware of recommendation for home hospice at this time and rationale. Hospice referral written in discussion with family - reviewed general hospice philosophy / support. Family planning on meeting with hospice later today at 130PM for prearranged meeting with [facility]. 3. Discussed goals of care with family. All wish for allowing natural death rather than life sustaining measures including NO chest compressions, shock and/or life support interventions. DDNR completed with  this date, copies provided to family per request.    4. Initial consult note routed to primary continuity provider  5.  Communicated plan of care with: Palliative IDT, family, attending, briefly discussed with case management     GOALS OF CARE:     [====Goals of Care====]  GOALS OF CARE:  Patient / health care proxy stated goals: home with hospice      TREATMENT PREFERENCES:   Code Status: DNR    Advance Care Planning:  Advance Care Planning 6/1/2017   Patient's Healthcare Decision Maker is: Verbal statement (Legal Next of Kin remains as decision maker)   Primary Decision Maker Name Rajat Celaya   Primary Decision Maker Phone Number [phone]   Primary Decision Maker Relationship to Patient -       Other:    The palliative care team has discussed with patient / health care proxy about goals of care / treatment preferences for patient.  [====Goals of Care====]    Advance Care Planning 6/1/2017   Patient's Healthcare Decision Maker is: Verbal statement (Legal Next of Kin remains as decision maker)   Primary Decision Maker Name Rajat Celaya   Primary Decision Maker Phone Number [phone]   Primary Decision Maker Relationship to Patient -       HISTORY:     History obtained from: family    CHIEF COMPLAINT: general decline    HPI/SUBJECTIVE:    The patient is:   [] Verbal and participatory  [x] Non-participatory due to:   Level of confusion - patient offers no complaints  Per family patient has had general decline over the last several months. No pain reports per family or patient. Per family appetite has been very poor and has been progressively declining.      Clinical Pain Assessment (nonverbal scale for nonverbal patients): Pain: 0  Patient denies pain or discomfort     FUNCTIONAL ASSESSMENT:     Palliative Performance Scale (PPS):  PPS: 30       PSYCHOSOCIAL/SPIRITUAL SCREENING:     Advance Care Planning:  Advance Care Planning 6/1/2017   Patient's Healthcare Decision Maker is: Verbal statement (Legal Next of Kin remains as decision maker)   Primary Decision Maker Name Rajat Celaya   Primary Decision Maker Phone Number [phone]   Primary Decision Maker Relationship to Patient -     Any spiritual / Pentecostalism concerns:  [] Yes /  [x] No    Caregiver Burnout:  [] Yes /  [x] No /  [] No Caregiver Present      Anticipatory grief assessment:   [] Normal  / [] Maladaptive       ESAS Anxiety: Anxiety: 0    ESAS Depression: Depression: 0        REVIEW OF SYSTEMS:     Positive and pertinent negative findings in ROS are noted above in HPI. The following systems were [x] reviewed / [] unable to be reviewed as noted in HPI  Patient denies any source of discomfort including pain, shortness of breath or nausea    Modified ESAS Completed by: provider   Fatigue: 0 Drowsiness: 0   Depression: 0 Pain: 0   Anxiety: 0     Anorexia: 4 Dyspnea: 0   Best Well-Bein Constipation: No   Other Problem (Comment): 0          PHYSICAL EXAM:     Wt Readings from Last 3 Encounters:   17 59.9 kg (132 lb)   17 67.1 kg (147 lb 14.9 oz)   17 72.6 kg (160 lb)     Blood pressure 119/65, pulse (!) 101, temperature 97 °F (36.1 °C), resp. rate 17, height 5' 6\" (1.676 m), weight 59.9 kg (132 lb), SpO2 93 %.   Pain:  Pain Scale 1: Numeric (0 - 10)  Pain Intensity 1: 0                 Last bowel movement: 2017    Constitutional: alert, NAD  Respiratory: breathing not labored, symmetric  Gastrointestinal: soft non-tender, +bowel sounds  Musculoskeletal: no deformity, no tenderness to palpation  Skin: warm, dry  Neurologic: following commands, moving all extremities  Psychiatric: full affect, oriented x 1     HISTORY:     Principal Problem:    Failure to thrive in adult (2017)    Active Problems:    Weight loss (2017)      Adenocarcinoma, metastatic ([facility] (2017)      Dehydration (2017)      HTN (hypertension) (2017)      HLD (hyperlipidemia) (2017)      ARLEEN (acute kidney injury) ([facility] (2017)      Dementia (2017)      Nausea & vomiting (2017)      Altered mental status, unspecified (2017)      Debility (2017)      Past Medical History:   Diagnosis Date    Hypertension     Ill-defined condition     elevated cholesterol      Past Surgical History:   Procedure Laterality Date    HX OTHER SURGICAL      left hip cyst removed      History reviewed. No pertinent family history. History reviewed, no pertinent family history. Social History   Substance Use Topics    Smoking status: Never Smoker    Smokeless tobacco: Not on file    Alcohol use No     No Known Allergies   Current Facility-Administered Medications   Medication Dose Route Frequency    dextrose 5 % - 0.45% NaCl infusion  100 mL/hr IntraVENous CONTINUOUS    acetaminophen (TYLENOL) tablet 650 mg  650 mg Oral Q4H PRN    ondansetron (ZOFRAN) injection 4 mg  4 mg IntraVENous Q4H PRN    heparin (porcine) injection 5,000 Units  5,000 Units SubCUTAneous Q8H    therapeutic multivitamin (THERAGRAN) tablet 1 Tab  1 Tab Oral DAILY    cefTRIAXone (ROCEPHIN) 1 g in 0.9% sodium chloride (MBP/ADV) 50 mL MBP  1 g IntraVENous Q24H        LAB AND IMAGING FINDINGS:     Lab Results   Component Value Date/Time    WBC 10.5 06/02/2017 04:07 AM    HGB 8.8 06/02/2017 04:07 AM    PLATELET 350 52/80/3502 04:07 AM     Lab Results   Component Value Date/Time    Sodium 137 06/01/2017 10:05 AM    Potassium 4.4 06/01/2017 10:05 AM    Chloride 102 06/01/2017 10:05 AM    CO2 30 06/01/2017 10:05 AM    BUN 36 06/01/2017 10:05 AM    Creatinine 1.37 06/01/2017 10:05 AM    Calcium 10.1 06/01/2017 10:05 AM    Magnesium 2.4 05/31/2017 09:12 PM      Lab Results   Component Value Date/Time    AST (SGOT) 20 06/01/2017 10:05 AM    Alk.  phosphatase 78 06/01/2017 10:05 AM    Protein, total 7.0 06/01/2017 10:05 AM    Albumin 2.6 06/01/2017 10:05 AM    Globulin 4.4 06/01/2017 10:05 AM     Lab Results   Component Value Date/Time    INR 1.4 06/02/2017 04:07 AM    Prothrombin time 16.7 06/02/2017 04:07 AM    aPTT 38.1 06/02/2017 04:07 AM      Lab Results   Component Value Date/Time    Iron 30 05/31/2017 09:12 PM    TIBC 201 05/31/2017 09:12 PM    Iron % saturation 15 05/31/2017 09:12 PM    Ferritin 1284 05/31/2017 09:12 PM      No results found for: PH, PCO2, PO2  No components found for: GLPOC   No results found for: CPK, CKMB           Total time: 50 minutes  Counseling / coordination time, spent as noted above: 35 minutes  > 50% counseling / coordination?: yes    Prolonged service was provided for  []30 min   []75 min in face to face time in the presence of the patient, spent as noted above. Time Start:   Time End:   Note: this can only be billed with 77961 (initial) or 78105 (follow up). If multiple start / stop times, list each separately.

## 2017-06-01 NOTE — PROGRESS NOTES
NUTRITION     Nutrition Consult: General Nutrition Management & Supplements      RECOMMENDATIONS / PLAN:     - Add supplement: Ensure Enlive TID  - Add daily multivitamin  - Continue RD inpatient monitoring and evaluation. NUTRITION INTERVENTIONS & DIAGNOSIS:     [x] Meals/Snacks: regular diet  [x] Medical food supplementation: add   [x] Vitamin/mineral supplementation: add daily MVI  [x] IV fluids: D5-1/2NS at 100 mL/hr: 120 gm dextrose, 408 kcal    Nutrition Diagnosis:  Unintentional weight loss related to inadequate energy intake as evidenced by weight loss of 15 lb (10%) in past 1 month PTA    ASSESSMENT:     6/1:  Patient unavailable at time of visit. Per nursing screen, pt reported unplanned weight loss and decreased appetite and meal intake PTA.      Average po intake adequate to meet patients estimated nutritional needs:   [] Yes     [] No   [] Unable to determine at this time    Diet: DIET REGULAR      Food Allergies:  None known   Current Appetite:   [] Good     [] Fair     [] Poor     [x] Other: unknown   Appetite/meal intake prior to admission:   [] Good     [] Fair     [x] Poor (per nursing screen)     [] Other:  Feeding Limitations:  [] Swallowing difficulty    [] Chewing difficulty    [] Other:  Current Meal Intake: Patient Vitals for the past 100 hrs:   % Diet Eaten   06/01/17 1306 0 %   06/01/17 1000 1 %       BM:  Unknown   Skin Integrity:  No pressure ulcer or wound  Edema:  None   Pertinent Medications: Reviewed    Recent Labs      06/01/17   1005  05/31/17   2112   NA  137  135*   K  4.4  4.8   CL  102  99*   CO2  30  27   GLU  134*  95   BUN  36*  36*   CREA  1.37*  1.32*   CA  10.1  10.9*   MG   --   2.4   ALB  2.6*  3.2*   SGOT  20  23   ALT  9*  10*       Intake/Output Summary (Last 24 hours) at 06/01/17 1335  Last data filed at 06/01/17 1306   Gross per 24 hour   Intake              220 ml   Output                0 ml   Net              220 ml       Anthropometrics:  Ht Readings from Last 1 Encounters:   04/27/17 5' 6\" (1.676 m)     Last 3 Recorded Weights in this Encounter    05/31/17 1947 06/01/17 0631   Weight: 56.2 kg (124 lb) 59.9 kg (132 lb)     Body mass index is 21.31 kg/(m^2). Weight History:  Noted weight loss in past 1 month PTA; 15 lb (10%)    Weight Metrics 6/1/2017 4/27/2017   Weight 132 lb 147 lb 14.9 oz   BMI 21.31 kg/m2 23.88 kg/m2        Admitting Diagnosis: dehydration. Failure to thrive in adult  Past Medical History:   Diagnosis Date    Hypertension     Ill-defined condition     elevated cholesterol       Education Needs:        [x] None identified  [] Identified - Not appropriate at this time  []  Identified and addressed - refer to education log  Learning Limitations:   [] None identified  [x] Identified: confused    Cultural, Jain & ethnic food preferences:  [x] None identified    [] Identified and addressed     ESTIMATED NUTRITION NEEDS:     Calories: 6153-0143 kcal (HBEx1.2-1.4) based on  [x] Actual BW: 60 kg      [] IBW   Protein: 60-72 gm (1-1.2 gm/kg) based on  [x] Actual BW      [] IBW   Fluid: 1 mL/kcal     MONITORING & EVALUATION:     Nutrition Goal(s):   1. Po intake of meals will meet >75% of patient estimated nutritional needs within the next 7 days.   Outcome:  [] Met/Ongoing    []  Not Met    [x] New/Initial Goal     Monitoring:   [x] Diet tolerance   [x] Meal intake   [x] Supplement intake   [] GI symptoms/ability to tolerate po diet   [] Respiratory status   [] Plan of care      Previous Recommendations (for follow-up assessments only):     []   Implemented       []   Not Implemented (RD to address)     [] No Recommendation Made     Discharge Planning:  Regular diet  [x] Participated in care planning, discharge planning, & interdisciplinary rounds as appropriate      Ivelisse Ramirez, [address]   Pager: [phone]

## 2017-06-01 NOTE — ROUTINE PROCESS
TRANSFER - OUT REPORT:    Verbal report given to ANITA Bloom (name) on Myrtle Herrera  being transferred to [facility] (unit) for routine progression of care       Report consisted of patients Situation, Background, Assessment and   Recommendations(SBAR). Information from the following report(s) SBAR, Kardex, ED Summary, [facility] and Recent Results was reviewed with the receiving nurse. Lines:   Peripheral IV 05/31/17 Right Hand (Active)   Site Assessment Clean, dry, & intact 5/31/2017 10:41 PM   Phlebitis Assessment 0 5/31/2017 10:41 PM   Infiltration Assessment 0 5/31/2017 10:41 PM   Dressing Status Clean, dry, & intact 5/31/2017 10:41 PM   Dressing Type Trach dressing; Tape 5/31/2017 10:41 PM   Hub Color/Line Status Yellow;Patent; Flushed 5/31/2017 10:41 PM        Opportunity for questions and clarification was provided.       Patient transported with:   Monitor  Registered Nurse

## 2017-06-01 NOTE — PROGRESS NOTES
Bedside and Verbal shift change report given to Jaqueline (oncoming nurse) by Silvia Tatum RN (offgoing nurse). Report included the following information SBAR, Kardex, MAR and Recent Results. SITUATION:    Code Status: DNR   Reason for Admission: dehydration.    Failure to thrive in adult    [facility] day: 0   Problem List:       Hospital Problems  Date Reviewed: 6/1/2017          Codes Class Noted POA    * (Principal)Failure to thrive in adult ICD-10-CM: R62.7  ICD-9-CM: 783.7  6/1/2017 Unknown        Weight loss ICD-10-CM: R63.4  ICD-9-CM: 783.21  6/1/2017 Unknown        Adenocarcinoma, metastatic ([facility] [address] ICD-10-CM: C79.9  ICD-9-CM: 199.1  6/1/2017 Unknown        Dehydration ICD-10-CM: E86.0  ICD-9-CM: 276.51  6/1/2017 Unknown        HTN (hypertension) ICD-10-CM: I10  ICD-9-CM: 401.9  6/1/2017 Unknown        HLD (hyperlipidemia) ICD-10-CM: E78.5  ICD-9-CM: 272.4  6/1/2017 Unknown        ARLEEN (acute kidney injury) ([facility] [address] ICD-10-CM: N17.9  ICD-9-CM: 584.9  6/1/2017 Unknown        Dementia ICD-10-CM: F03.90  ICD-9-CM: 294.20  6/1/2017 Unknown        Nausea & vomiting ICD-10-CM: R11.2  ICD-9-CM: 787.01  6/1/2017 Unknown              BACKGROUND:    Past Medical History:   Past Medical History:   Diagnosis Date    Hypertension     Ill-defined condition     elevated cholesterol         Patient taking anticoagulants yes     ASSESSMENT:    Changes in Assessment Throughout Shift: none     Patient has Central Line: no Reasons if yes: n/a   Patient has Taylor Cath: no Reasons if yes: n/a      Last Vitals:     Vitals:    06/01/17 0631 06/01/17 0802 06/01/17 1135 06/01/17 1521   BP: 120/84 115/74 99/74 117/82   Pulse: 99 95 96 89   Resp: 20 18 18 16   Temp: 98 °F (36.7 °C) 97 °F (36.1 °C) 96.9 °F (36.1 °C) 97.1 °F (36.2 °C)   SpO2: 100% 96% 94% 96%   Weight: 59.9 kg (132 lb)           IV and DRAINS (will only show if present)   Peripheral IV 05/31/17 Right Hand-Site Assessment: Clean, dry, & intact     WOUND (if present)   Wound Type:  none   Dressing present Dressing Present : Yes   Wound Concerns/Notes:  none     PAIN    Pain Assessment    Pain Intensity 1: 0 (06/01/17 0200)              Patient Stated Pain Goal: 0  o Interventions for Pain:  See MAR  o Intervention effective: yes  o Time of last intervention: none   o Reassessment Completed: yes      Last 3 Weights:  Last 3 Recorded Weights in this Encounter    05/31/17 1947 06/01/17 0631   Weight: 56.2 kg (124 lb) 59.9 kg (132 lb)     Weight change:      INTAKE/OUPUT    Current Shift: 06/01 0701 - 06/01 1900  In: 220 [P.O.:220]  Out: 200 [Urine:200]    Last three shifts:       LAB RESULTS     Recent Labs      05/31/17 2112   WBC  13.3*   HGB  11.1*   HCT  31.9*   PLT  108*        Recent Labs      06/01/17   1005  05/31/17 2112   NA  137  135*   K  4.4  4.8   GLU  134*  95   BUN  36*  36*   CREA  1.37*  1.32*   CA  10.1  10.9*   MG   --   2.4   INR   --   1.3*       RECOMMENDATIONS AND DISCHARGE PLANNING     1. Pending tests/procedures/ Plan of Care or Other Needs: none     2. Discharge plan for patient and Needs/Barriers: home    3. Estimated Discharge Date: 6/3/17 Posted on Whiteboard in [facility] Room: yes      4. The patient's care plan was reviewed with the oncoming nurse. \"HEALS\" SAFETY CHECK      Fall Risk    Total Score: 1    Safety Measures: Safety Measures: Bed/Chair-Wheels locked, Bed in low position, Call light within reach    A safety check occurred in the patient's room between off going nurse and oncoming nurse listed above.     The safety check included the below items  Area Items   H  High Alert Medications - Verify all high alert medication drips (heparin, PCA, etc.)   E  Equipment - Suction is set up for ALL patients (with yanker)  - Red plugs utilized for all equipment (IV pumps, etc.)  - WOWs wiped down at end of shift.  - Room stocked with oxygen, suction, and other unit-specific supplies   A  Alarms - Bed alarm is set for fall risk patients  - Ensure chair alarm is in place and activated if patient is up in a chair   L  Lines - Check IV for any infiltration  - Taylor bag is empty if patient has a Taylor   - Tubing and IV bags are labeled   S  Safety   - Room is clean, patient is clean, and equipment is clean. - Hallways are clear from equipment besides carts. - Fall bracelet on for fall risk patients  - Ensure room is clear and free of clutter  - Suction is set up for ALL patients (with yanker)  - Hallways are clear from equipment besides carts.    - Isolation precautions followed, supplies available outside room, sign posted     Luis Amador RN

## 2017-06-01 NOTE — PROGRESS NOTES
This writer received a call from [facility] who reports that they are this pt's Hospice and plan to discharge this pt home with hospice that was set up prior to this admission. The worker stated that she came to this facility today and met with family regarding their needing to set things up at at home for this pt's being discharged home with hospice. This writer spoke with family who reports that hospice contacted them prior to this admission, but at that time they preferred to bring pt to the hospital. Pt's family reports that since pt's M.D. Chose this hospice agency, they will accept their services. The plan for this pt is to be discharged to home as soon as the DME is delivered which is slated for discharge home with hospice. Pt's family are breaking down furniture for the equipment to be delivered hopefully tomorrow.

## 2017-06-01 NOTE — ROUTINE PROCESS
TRANSFER - IN REPORT:    Verbal report received from [address] [place], [place] [address]David, RN(name) on Sybil Reno  being received from ED (unit) for routine progression of care      Report consisted of patients Situation, Background, Assessment and   Recommendations(SBAR). Information from the following report(s) SBAR and Kardex    was reviewed with the receiving nurse. Opportunity for questions and clarification was provided. Assessment completed upon patients arrival to unit and care assumed.

## 2017-06-01 NOTE — PROGRESS NOTES
Phone: [phone]     Hematology / Oncology Progress Note  [facility]      Patient: Edwin Chopra   MRN: [id number]         [facility]: [id number]    YOB: 1935      Admit Date: 2017    Assessment:     Principal Problem:    Failure to thrive in adult (2017)    Active Problems:    Weight loss (2017)      Adenocarcinoma, metastatic ([facility] [address] (2017)      Dehydration (2017)      HTN (hypertension) (2017)      HLD (hyperlipidemia) (2017)      ARLEEN (acute kidney injury) ([facility] [address] (2017)      Dementia (2017)      Nausea & vomiting (2017)    metastatic adenocarcinoma, likely, endometrial primary  Failure to thrive    Plan: Wide spread disease. Path- immuno stains c/w endometrial primary. Supportive care would be the best option for her, not a candidate for aggressive chemotherapy. Completion of advance directives. D/w palliative care team.    OFFICE NOTE IN BLUE FOLDER    Beth Rubi  [facility] [phone]      Subjective:     Not in pain this am. Had called the office yesterday with abdominal pain.   Did not allow home hospice to evaluate her when they visited her home last evening    Objective:     Visit Vitals    /74 (BP 1 Location: Right arm, BP Patient Position: At rest)    Pulse 95    Temp 97 °F (36.1 °C)    Resp 18    Wt 59.9 kg (132 lb)    SpO2 96%    BMI 21.31 kg/m2             Temp (24hrs), Av.1 °F (36.7 °C), Min:97 °F (36.1 °C), Max:99.7 °F (37.6 °C)      No intake or output data in the 24 hours ending 17 0819  Review of Systems:   Constitutional: negative for fevers, chills, sweats and positive for  fatigue  Eyes: negative for visual disturbance, redness and icterus  Ears, Nose, Mouth, Throat, and Face: negative for tinnitus, epistaxis, sore mouth and hoarseness  Respiratory: negative for cough, sputum, hemoptysis, pleurisy/chest pain or wheezing  Cardiovascular: negative for chest pain, chest pressure/discomfort, palpitations, irregular heart beats, syncope, paroxysmal nocturnal dyspnea  Gastrointestinal:poor appetite, wt loss   negative for reflux symptoms, nausea, vomiting, change in bowel habits, melena, diarrhea, constipation and abdominal pain  Genitourinary:negative for dysuria, nocturia, urinary incontinence, hesitancy and hematuria  Integument: negative for rash, skin lesion(s) and pruritus  Hematologic/Lymphatic: negative for easy bruising, bleeding and lymphadenopathy  Musculoskeletal:negative for myalgias, arthralgias and bone pain  Neurological: negative for headaches, dizziness, seizures, paresthesia and weakness    Physical Exam:  Constitutional: Alert, oriented, not in distress  Eyes: PERRLA, anicteric, pallor  Ears, nose, mouth, throat, and face: no palpable Lymph nodes, no mucositis, no thrush. Respiratory: symmetrical expansion, no rales, no rhonchi, no wheezing. Cardiovascular: S1S2, no pathologic murmur, no rub. Gastrointestinal: soft, benign, non tender, no HSM, normal bowel sounds, no mass. Integument: no rash, no petechiae, no ecchymosis. Musculoskeletal:wasting  Neurological: intact, cranial nerves, no focal motor or sensory deficits.       Labs:  Recent Results (from the past 24 hour(s))   EKG, 12 LEAD, INITIAL    Collection Time: 05/31/17  9:07 PM   Result Value Ref Range    Ventricular Rate 103 BPM    Atrial Rate 103 BPM    P-R Interval 146 ms    QRS Duration 78 ms    Q-T Interval 328 ms    QTC Calculation (Bezet) 429 ms    Calculated P Axis 7 degrees    Calculated R Axis -41 degrees    Calculated T Axis 47 degrees    Diagnosis       Sinus tachycardia with premature atrial complexes  Left axis deviation  Septal infarct , age undetermined  Abnormal ECG  No previous ECGs available     CBC WITH AUTOMATED DIFF    Collection Time: 05/31/17  9:12 PM   Result Value Ref Range    WBC 13.3 (H) 4.6 - 13.2 K/uL    RBC 4.55 4.20 - 5.30 M/uL    HGB 11.1 (L) 12.0 - 16.0 g/dL    HCT 31.9 (L) 35.0 - 45.0 %    MCV 70.1 (L) 74.0 - 97.0 FL    MCH 24.4 24.0 - 34.0 PG    MCHC 34.8 31.0 - 37.0 g/dL    RDW 17.4 (H) 11.6 - 14.5 %    PLATELET 443 (L) 654 - 420 K/uL    NEUTROPHILS 88 (H) 40 - 73 %    LYMPHOCYTES 6 (L) 21 - 52 %    MONOCYTES 6 3 - 10 %    EOSINOPHILS 0 0 - 5 %    BASOPHILS 0 0 - 2 %    ABS. NEUTROPHILS 11.7 (H) 1.8 - 8.0 K/UL    ABS. LYMPHOCYTES 0.8 (L) 0.9 - 3.6 K/UL    ABS. MONOCYTES 0.8 0.05 - 1.2 K/UL    ABS. EOSINOPHILS 0.0 0.0 - 0.4 K/UL    ABS. BASOPHILS 0.0 0.0 - 0.1 K/UL    DF AUTOMATED      PLATELET COMMENTS DECREASED PLATELETS      RBC COMMENTS MICROCYTOSIS  2+        RBC COMMENTS ANISOCYTOSIS  1+       METABOLIC PANEL, BASIC    Collection Time: 05/31/17  9:12 PM   Result Value Ref Range    Sodium 135 (L) 136 - 145 mmol/L    Potassium 4.8 3.5 - 5.5 mmol/L    Chloride 99 (L) 100 - 108 mmol/L    CO2 27 21 - 32 mmol/L    Anion gap 9 3.0 - 18 mmol/L    Glucose 95 74 - 99 mg/dL    BUN 36 (H) 7.0 - 18 MG/DL    Creatinine 1.32 (H) 0.6 - 1.3 MG/DL    BUN/Creatinine ratio 27 (H) 12 - 20      GFR est AA 47 (L) >60 ml/min/1.73m2    GFR est non-AA 39 (L) >60 ml/min/1.73m2    Calcium 10.9 (H) 8.5 - 10.1 MG/DL   HEPATIC FUNCTION PANEL    Collection Time: 05/31/17  9:12 PM   Result Value Ref Range    Protein, total 8.5 (H) 6.4 - 8.2 g/dL    Albumin 3.2 (L) 3.4 - 5.0 g/dL    Globulin 5.3 (H) 2.0 - 4.0 g/dL    A-G Ratio 0.6 (L) 0.8 - 1.7      Bilirubin, total 0.6 0.2 - 1.0 MG/DL    Bilirubin, direct 0.3 (H) 0.0 - 0.2 MG/DL    Alk.  phosphatase 98 45 - 117 U/L    AST (SGOT) 23 15 - 37 U/L    ALT (SGPT) 10 (L) 13 - 56 U/L   LIPASE    Collection Time: 05/31/17  9:12 PM   Result Value Ref Range    Lipase 111 73 - 393 U/L   MAGNESIUM    Collection Time: 05/31/17  9:12 PM   Result Value Ref Range    Magnesium 2.4 1.6 - 2.6 mg/dL   PROTHROMBIN TIME + INR    Collection Time: 05/31/17  9:12 PM   Result Value Ref Range    Prothrombin time 15.9 (H) 11.5 - 15.2 sec    INR 1.3 (H) 0.8 - 1.2     IRON PROFILE Collection Time: 05/31/17  9:12 PM   Result Value Ref Range    Iron 30 (L) 50 - 175 ug/dL    TIBC 201 (L) 250 - 450 ug/dL    Iron % saturation 15 %   FERRITIN    Collection Time: 05/31/17  9:12 PM   Result Value Ref Range    Ferritin 1284 (H) 8 - 388 NG/ML   VITAMIN B12    Collection Time: 05/31/17  9:12 PM   Result Value Ref Range    Vitamin B12 446 211 - 911 pg/mL   FOLATE    Collection Time: 05/31/17  9:12 PM   Result Value Ref Range    Folate 6.8 3.10 - 17.50 ng/mL   TSH 3RD GENERATION    Collection Time: 05/31/17  9:12 PM   Result Value Ref Range    TSH 0.69 0.36 - 3.74 uIU/mL   URINALYSIS W/ RFLX MICROSCOPIC    Collection Time: 06/01/17 12:01 AM   Result Value Ref Range    Color DARK YELLOW      Appearance CLEAR      Specific gravity 1.028 1.005 - 1.030      pH (UA) 5.0 5.0 - 8.0      Protein 30 (A) NEG mg/dL    Glucose NEGATIVE  NEG mg/dL    Ketone 15 (A) NEG mg/dL    Bilirubin MODERATE (A) NEG      Blood NEGATIVE  NEG      Urobilinogen 1.0 0.2 - 1.0 EU/dL    Nitrites NEGATIVE  NEG      Leukocyte Esterase TRACE (A) NEG     URINE MICROSCOPIC ONLY    Collection Time: 06/01/17 12:01 AM   Result Value Ref Range    WBC 0 to 3 0 - 4 /hpf    RBC NEGATIVE  0 - 5 /hpf    Epithelial cells FEW 0 - 5 /lpf    Bacteria NEGATIVE  NEG /hpf    Hyaline cast 0 to 3 0 - 2 /lpf

## 2017-06-01 NOTE — ED PROVIDER NOTES
HPI Comments: 8:07 PM Faisal Sosa is a 80 y.o. female with a hx of HTN and elevated cholesterol who presents to the ED c/o rapid weight loss. Per , pt has not been drinking any fluids or consuming any meals X 2 days. Niece noticed some active vomiting earlier today. Husbands states that the patient has abdominal cancer that's spread into some lymph nodes, but physicians are unaware of the origin. She is scheduled to start chemotherapy tomorrow at 9 AM with her Oncologist, Dr. Malena Roger. Per niece, pt had a procedure performed in April 2017 and that's when the cancer was discovered. Her PCP's nurse instructed her to come into the ED today for an evaluation. Pt denies any active pain. She is under [facility]. Family hx of Pancreatic cancer. The history is provided by the patient. Past Medical History:   Diagnosis Date    Hypertension     Ill-defined condition     elevated cholesterol       Past Surgical History:   Procedure Laterality Date    HX OTHER SURGICAL      left hip cyst removed         History reviewed. No pertinent family history. Social History     Social History    Marital status:      Spouse name: N/A    Number of children: N/A    Years of education: N/A     Occupational History    Not on file. Social History Main Topics    Smoking status: Never Smoker    Smokeless tobacco: Not on file    Alcohol use No    Drug use: No    Sexual activity: Not on file     Other Topics Concern    Not on file     Social History Narrative         ALLERGIES: Review of patient's allergies indicates no known allergies. Review of Systems   Constitutional: Positive for unexpected weight change (loss). Negative for activity change and appetite change. HENT: Negative for congestion, ear discharge, ear pain, facial swelling, nosebleeds, postnasal drip, sinus pressure, sneezing and tinnitus. Eyes: Negative for pain, discharge, redness and visual disturbance. Respiratory: Negative for apnea, cough, choking, chest tightness, shortness of breath, wheezing and stridor. Cardiovascular: Negative for chest pain and leg swelling. Gastrointestinal: Positive for vomiting. Negative for abdominal distention, abdominal pain, anal bleeding, blood in stool, constipation, diarrhea and nausea. Genitourinary: Negative for decreased urine volume, difficulty urinating, dyspareunia, dysuria, flank pain, frequency, hematuria, pelvic pain, urgency, vaginal bleeding and vaginal discharge. Musculoskeletal: Negative for arthralgias, back pain, gait problem, joint swelling, myalgias, neck pain and neck stiffness. Skin: Negative for color change. Neurological: Negative for dizziness, tremors, seizures, speech difficulty, weakness, numbness and headaches. Hematological: Negative for adenopathy. Does not bruise/bleed easily. Psychiatric/Behavioral: Negative for agitation, dysphoric mood and self-injury. The patient is not nervous/anxious. All other systems reviewed and are negative. Vitals:    05/31/17 1947   BP: 128/84   Pulse: 91   Resp: 19   Temp: 97.8 °F (36.6 °C)   SpO2: 99%   Weight: 56.2 kg (124 lb)            Physical Exam   Constitutional: She is oriented to person, place, and time. She appears well-developed and well-nourished. HENT:   Head: Normocephalic and atraumatic. Right Ear: External ear normal.   Left Ear: External ear normal.   Nose: Nose normal.   Mouth/Throat: Oropharynx is clear and moist.   Eyes: Conjunctivae and EOM are normal. Pupils are equal, round, and reactive to light. Neck: Normal range of motion. Neck supple. Cardiovascular: Regular rhythm, normal heart sounds and intact distal pulses. Pulmonary/Chest: Effort normal and breath sounds normal. No respiratory distress. She has no wheezes. She has no rales. She exhibits no tenderness. Abdominal: Soft. Bowel sounds are normal. She exhibits no distension and no mass.  There is no tenderness. There is no rebound and no guarding. Musculoskeletal: Normal range of motion. She exhibits no edema. Neurological: She is alert and oriented to person, place, and time. Skin: Skin is warm and dry. No rash noted. No erythema. Psychiatric: She has a normal mood and affect. Her behavior is normal. Judgment normal.   Nursing note and vitals reviewed. MDM  Number of Diagnoses or Management Options  Diagnosis management comments: Sent in by Hospice for evaluation. Poor appetite, history of cancer, unknown primary. Will trend labs, hydrate. Review charts.        Amount and/or Complexity of Data Reviewed  Clinical lab tests: ordered  Tests in the radiology section of CPT®: ordered      ED Course       Procedures    Vitals:  Patient Vitals for the past 12 hrs:   Temp Pulse Resp BP SpO2   05/31/17 1947 97.8 °F (36.6 °C) 91 19 128/84 99 %       Medications ordered:   Medications   0.9% sodium chloride infusion (150 mL/hr IntraVENous New Bag 5/31/17 2320)         Lab findings:  Recent Results (from the past 12 hour(s))   EKG, 12 LEAD, INITIAL    Collection Time: 05/31/17  9:07 PM   Result Value Ref Range    Ventricular Rate 103 BPM    Atrial Rate 103 BPM    P-R Interval 146 ms    QRS Duration 78 ms    Q-T Interval 328 ms    QTC Calculation (Bezet) 429 ms    Calculated P Axis 7 degrees    Calculated R Axis -41 degrees    Calculated T Axis 47 degrees    Diagnosis       Sinus tachycardia with premature atrial complexes  Left axis deviation  Septal infarct , age undetermined  Abnormal ECG  No previous ECGs available     CBC WITH AUTOMATED DIFF    Collection Time: 05/31/17  9:12 PM   Result Value Ref Range    WBC 13.3 (H) 4.6 - 13.2 K/uL    RBC 4.55 4.20 - 5.30 M/uL    HGB 11.1 (L) 12.0 - 16.0 g/dL    HCT 31.9 (L) 35.0 - 45.0 %    MCV 70.1 (L) 74.0 - 97.0 FL    MCH 24.4 24.0 - 34.0 PG    MCHC 34.8 31.0 - 37.0 g/dL    RDW 17.4 (H) 11.6 - 14.5 %    PLATELET 603 (L) 012 - 420 K/uL    NEUTROPHILS 88 (H) 40 - 73 %    LYMPHOCYTES 6 (L) 21 - 52 %    MONOCYTES 6 3 - 10 %    EOSINOPHILS 0 0 - 5 %    BASOPHILS 0 0 - 2 %    ABS. NEUTROPHILS 11.7 (H) 1.8 - 8.0 K/UL    ABS. LYMPHOCYTES 0.8 (L) 0.9 - 3.6 K/UL    ABS. MONOCYTES 0.8 0.05 - 1.2 K/UL    ABS. EOSINOPHILS 0.0 0.0 - 0.4 K/UL    ABS. BASOPHILS 0.0 0.0 - 0.1 K/UL    DF AUTOMATED      PLATELET COMMENTS DECREASED PLATELETS      RBC COMMENTS MICROCYTOSIS  2+        RBC COMMENTS ANISOCYTOSIS  1+       METABOLIC PANEL, BASIC    Collection Time: 05/31/17  9:12 PM   Result Value Ref Range    Sodium 135 (L) 136 - 145 mmol/L    Potassium 4.8 3.5 - 5.5 mmol/L    Chloride 99 (L) 100 - 108 mmol/L    CO2 27 21 - 32 mmol/L    Anion gap 9 3.0 - 18 mmol/L    Glucose 95 74 - 99 mg/dL    BUN 36 (H) 7.0 - 18 MG/DL    Creatinine 1.32 (H) 0.6 - 1.3 MG/DL    BUN/Creatinine ratio 27 (H) 12 - 20      GFR est AA 47 (L) >60 ml/min/1.73m2    GFR est non-AA 39 (L) >60 ml/min/1.73m2    Calcium 10.9 (H) 8.5 - 10.1 MG/DL   HEPATIC FUNCTION PANEL    Collection Time: 05/31/17  9:12 PM   Result Value Ref Range    Protein, total 8.5 (H) 6.4 - 8.2 g/dL    Albumin 3.2 (L) 3.4 - 5.0 g/dL    Globulin 5.3 (H) 2.0 - 4.0 g/dL    A-G Ratio 0.6 (L) 0.8 - 1.7      Bilirubin, total 0.6 0.2 - 1.0 MG/DL    Bilirubin, direct 0.3 (H) 0.0 - 0.2 MG/DL    Alk. phosphatase 98 45 - 117 U/L    AST (SGOT) 23 15 - 37 U/L    ALT (SGPT) 10 (L) 13 - 56 U/L   LIPASE    Collection Time: 05/31/17  9:12 PM   Result Value Ref Range    Lipase 111 73 - 393 U/L   MAGNESIUM    Collection Time: 05/31/17  9:12 PM   Result Value Ref Range    Magnesium 2.4 1.6 - 2.6 mg/dL   PROTHROMBIN TIME + INR    Collection Time: 05/31/17  9:12 PM   Result Value Ref Range    Prothrombin time 15.9 (H) 11.5 - 15.2 sec    INR 1.3 (H) 0.8 - 1.2         EKG interpretation by ED Physician:  2107 Sinus tachycardia with premature atrial complexes. Rate of 103. No STEMI.        X-Ray, CT or other radiology findings or impressions:  XR CHEST PORT    (Results Pending) Progress notes, Consult notes or additional Procedure notes:   8:08 PM [facility] [phone] for consult. 11:46 PM Consult: Discussed care with Dr. Mónica De Jesus (HOSPITALIST). Standard discussion; including history of patients chief complaint, available diagnostic results, and treatment course. Agrees with admission. Reevaluation of patient:   I have reassessed the patient. Patient is feeling okay. Patient was admitted. 11:45 PM Pt reevaluated at this time. Discussed results and findings, as well as, plan for admission. Pt and family verbalizes understanding and agreement with plan. Disposition:  Diagnosis:   1. Dehydration    2. Metastatic carcinoma ([facility] [address]        Disposition: Admit    Follow-up Information     None           Patient's Medications   Start Taking    No medications on file   Continue Taking    ROSUVASTATIN (CRESTOR) 10 MG TABLET    Take 10 mg by mouth nightly. TELMISARTAN (MICARDIS) 20 MG TABLET    Take 20 mg by mouth daily. These Medications have changed    No medications on file   Stop Taking    No medications on file         SCRIBE ATTESTATION STATEMENT  Documented by: Peewee Isidro for, and in the presence of, Sharmila Hooks MD 8:08 PM    Signed by: Rogelio Boss, 05/31/17 8:08 PM    PROVIDER ATTESTATION STATEMENT  I personally performed the services described in the documentation, reviewed the documentation, as recorded by the scribe in my presence, and it accurately and completely records my words and actions.   Sharmila Hooks MD

## 2017-06-01 NOTE — ROUTINE PROCESS
Bedside and Verbal shift change report given to Lilo Saunders RN (oncoming nurse) by Alvarez Freitas (offgoing nurse). Report included the following information SBAR, Kardex, MAR and Recent Results. SITUATION:    Code Status: Full Code   Reason for Admission: dehydration.    Failure to thrive in adult    [facility] day: 0   Problem List:       Hospital Problems  Date Reviewed: 6/1/2017          Codes Class Noted POA    * (Principal)Failure to thrive in adult ICD-10-CM: R62.7  ICD-9-CM: 783.7  6/1/2017 Unknown        Weight loss ICD-10-CM: R63.4  ICD-9-CM: 783.21  6/1/2017 Unknown        Adenocarcinoma, metastatic ([facility] [address] ICD-10-CM: C79.9  ICD-9-CM: 199.1  6/1/2017 Unknown        Dehydration ICD-10-CM: E86.0  ICD-9-CM: 276.51  6/1/2017 Unknown        HTN (hypertension) ICD-10-CM: I10  ICD-9-CM: 401.9  6/1/2017 Unknown        HLD (hyperlipidemia) ICD-10-CM: E78.5  ICD-9-CM: 272.4  6/1/2017 Unknown        ARLEEN (acute kidney injury) ([facility] [address] ICD-10-CM: N17.9  ICD-9-CM: 584.9  6/1/2017 Unknown        Dementia ICD-10-CM: F03.90  ICD-9-CM: 294.20  6/1/2017 Unknown        Nausea & vomiting ICD-10-CM: R11.2  ICD-9-CM: 787.01  6/1/2017 Unknown              BACKGROUND:    Past Medical History:   Past Medical History:   Diagnosis Date    Hypertension     Ill-defined condition     elevated cholesterol         Patient taking anticoagulants yes     ASSESSMENT:    Changes in Assessment Throughout Shift:        Patient has Central Line: no Reasons if yes:      Patient has Taylor Cath: no Reasons if yes:          Last Vitals:     Vitals:    06/01/17 0115 06/01/17 0130 06/01/17 0205 06/01/17 0631   BP: 114/79 104/71 118/79 120/84   Pulse: 99 (!) 101 98 99   Resp: 16 15 16 20   Temp:   99.7 °F (37.6 °C) 98 °F (36.7 °C)   SpO2:    100%   Weight:    59.9 kg (132 lb)        IV and DRAINS (will only show if present)   Peripheral IV 05/31/17 Right Hand-Site Assessment: Clean, dry, & intact     WOUND (if present)   Wound Type: none   Dressing present     Wound Concerns/Notes:  none     PAIN    Pain Assessment    Pain Intensity 1: 0 (06/01/17 0200)              Patient Stated Pain Goal: 0  o Interventions for Pain:  none  o Intervention effective: no  o Time of last intervention:   o    o Reassessment Completed: yes      Last 3 Weights:  Last 3 Recorded Weights in this Encounter    05/31/17 1947 06/01/17 0631   Weight: 56.2 kg (124 lb) 59.9 kg (132 lb)     Weight change:      INTAKE/OUPUT    Current Shift:      Last three shifts:       LAB RESULTS     Recent Labs      05/31/17 2112   WBC  13.3*   HGB  11.1*   HCT  31.9*   PLT  108*        Recent Labs      05/31/17 2112   NA  135*   K  4.8   GLU  95   BUN  36*   CREA  1.32*   CA  10.9*   MG  2.4   INR  1.3*       RECOMMENDATIONS AND DISCHARGE PLANNING     1. Pending tests/procedures/ Plan of Care or Other Needs: OT/PT consult  to nutrition     2. Discharge plan for patient and Needs/Barriers: none    3. Estimated Discharge Date: TBD Posted on Whiteboard in Patients Room: yes      4. The patient's care plan was reviewed with the oncoming nurse. \"HEALS\" SAFETY CHECK      Fall Risk    Total Score: 1    Safety Measures: Safety Measures: Bed/Chair alarm on, Bed/Chair-Wheels locked, Bed in low position, Call light within reach, Fall prevention (comment)    A safety check occurred in the patient's room between off going nurse and oncoming nurse listed above.     The safety check included the below items  Area Items   H  High Alert Medications - Verify all high alert medication drips (heparin, PCA, etc.)   E  Equipment - Suction is set up for ALL patients (with yanker)  - Red plugs utilized for all equipment (IV pumps, etc.)  - WOWs wiped down at end of shift.  - Room stocked with oxygen, suction, and other unit-specific supplies   A  Alarms - Bed alarm is set for fall risk patients  - Ensure chair alarm is in place and activated if patient is up in a chair   L  Lines - Check IV for any infiltration  - Taylor bag is empty if patient has a Taylor   - Tubing and IV bags are labeled   S  Safety   - Room is clean, patient is clean, and equipment is clean. - Hallways are clear from equipment besides carts. - Fall bracelet on for fall risk patients  - Ensure room is clear and free of clutter  - Suction is set up for ALL patients (with cierra)  - Hallways are clear from equipment besides carts.    - Isolation precautions followed, supplies available outside room, sign posted     Merle Pryor

## 2017-06-02 VITALS
OXYGEN SATURATION: 93 % | WEIGHT: 132 LBS | SYSTOLIC BLOOD PRESSURE: 100 MMHG | TEMPERATURE: 97.9 F | BODY MASS INDEX: 21.21 KG/M2 | RESPIRATION RATE: 18 BRPM | HEART RATE: 94 BPM | DIASTOLIC BLOOD PRESSURE: 68 MMHG | HEIGHT: 66 IN

## 2017-06-02 PROBLEM — R41.82 ALTERED MENTAL STATUS, UNSPECIFIED: Status: ACTIVE | Noted: 2017-06-02

## 2017-06-02 PROBLEM — R53.81 DEBILITY: Status: ACTIVE | Noted: 2017-06-02

## 2017-06-02 LAB
APTT PPP: 38.1 SEC (ref 23–36.4)
ERYTHROCYTE [DISTWIDTH] IN BLOOD BY AUTOMATED COUNT: 17.3 % (ref 11.6–14.5)
GLUCOSE BLD STRIP.AUTO-MCNC: 78 MG/DL (ref 70–110)
GLUCOSE BLD STRIP.AUTO-MCNC: 86 MG/DL (ref 70–110)
HCT VFR BLD AUTO: 26.2 % (ref 35–45)
HGB BLD-MCNC: 8.8 G/DL (ref 12–16)
INR PPP: 1.4 (ref 0.8–1.2)
MCH RBC QN AUTO: 23.5 PG (ref 24–34)
MCHC RBC AUTO-ENTMCNC: 33.6 G/DL (ref 31–37)
MCV RBC AUTO: 70.1 FL (ref 74–97)
PLATELET # BLD AUTO: 119 K/UL (ref 135–420)
PROTHROMBIN TIME: 16.7 SEC (ref 11.5–15.2)
RBC # BLD AUTO: 3.74 M/UL (ref 4.2–5.3)
WBC # BLD AUTO: 10.5 K/UL (ref 4.6–13.2)

## 2017-06-02 PROCEDURE — 36415 COLL VENOUS BLD VENIPUNCTURE: CPT | Performed by: INTERNAL MEDICINE

## 2017-06-02 PROCEDURE — 85730 THROMBOPLASTIN TIME PARTIAL: CPT | Performed by: INTERNAL MEDICINE

## 2017-06-02 PROCEDURE — 96361 HYDRATE IV INFUSION ADD-ON: CPT

## 2017-06-02 PROCEDURE — 74011000258 HC RX REV CODE- 258: Performed by: INTERNAL MEDICINE

## 2017-06-02 PROCEDURE — 96372 THER/PROPH/DIAG INJ SC/IM: CPT

## 2017-06-02 PROCEDURE — 74011250637 HC RX REV CODE- 250/637: Performed by: HOSPITALIST

## 2017-06-02 PROCEDURE — 74011250636 HC RX REV CODE- 250/636: Performed by: INTERNAL MEDICINE

## 2017-06-02 PROCEDURE — 85610 PROTHROMBIN TIME: CPT | Performed by: INTERNAL MEDICINE

## 2017-06-02 PROCEDURE — 99218 HC RM OBSERVATION: CPT

## 2017-06-02 PROCEDURE — 85027 COMPLETE CBC AUTOMATED: CPT | Performed by: INTERNAL MEDICINE

## 2017-06-02 PROCEDURE — 82962 GLUCOSE BLOOD TEST: CPT

## 2017-06-02 RX ORDER — THERA TABS 400 MCG
1 TAB ORAL DAILY
Qty: 30 TAB | Refills: 0 | Status: SHIPPED | OUTPATIENT
Start: 2017-06-02

## 2017-06-02 RX ORDER — ONDANSETRON 4 MG/1
4 TABLET, ORALLY DISINTEGRATING ORAL
Qty: 30 TAB | Refills: 0 | Status: SHIPPED | OUTPATIENT
Start: 2017-06-02

## 2017-06-02 RX ADMIN — HEPARIN SODIUM 5000 UNITS: 5000 INJECTION, SOLUTION INTRAVENOUS; SUBCUTANEOUS at 11:52

## 2017-06-02 RX ADMIN — THERA TABS 1 TABLET: TAB at 08:11

## 2017-06-02 RX ADMIN — DEXTROSE MONOHYDRATE AND SODIUM CHLORIDE 100 ML/HR: 5; .45 INJECTION, SOLUTION INTRAVENOUS at 09:13

## 2017-06-02 NOTE — PROGRESS NOTES
D/c summary dictated with plan for home hospice noted. No further interventions. Signing off at this time.

## 2017-06-02 NOTE — DISCHARGE INSTRUCTIONS
Discharge Instructions    Patient: Ursula Sanders MRN: [id number]  CSN: [id number]    YOB: 1935  Age: 80 y.o. Sex: female    DOA: 5/31/2017 LOS:  LOS: 0 days   Discharge Date:      DIET:  Regular Diet    ACTIVITY: Activity as tolerated  Home health care for hospice care     ADDITIONAL INFORMATION: If you experience any of the following symptoms but not limited to Fever, chills, nausea, vomiting, diarrhea, change in mentation, falling, bleeding, shortness of breath, chest pain, please call your primary care physician or return to the emergency room if you cannot get hold of your doctor:     FOLLOW UP CARE:  Dr. Carroll Bustamante MD as needed.      Giovanni Beltrán MD  6/2/2017 11:52 AM

## 2017-06-02 NOTE — PROGRESS NOTES
conducted an initial consultation and Spiritual Assessment for Kayla Dawn, who is a 80 y.o.,female. Patients Primary Language is: [place]. According to the patients EMR Yazidism Affiliation is: No Restoration. The reason the Patient came to the hospital is:   Patient Active Problem List    Diagnosis Date Noted    Altered mental status, unspecified 06/02/2017    Debility 06/02/2017    Failure to thrive in adult 06/01/2017    Weight loss 06/01/2017    Adenocarcinoma, metastatic ([facility] [address] 06/01/2017    Dehydration 06/01/2017    HTN (hypertension) 06/01/2017    HLD (hyperlipidemia) 06/01/2017    ARLEEN (acute kidney injury) ([facility] [address] 06/01/2017    Dementia 06/01/2017    Nausea & vomiting 06/01/2017        The  provided the following Interventions:  Initiated a relationship of care and support. Explored issues of sammie, belief, spirituality and Yazidism/ritual needs while hospitalized. Listened empathically. Provided chaplaincy education. Provided information about Spiritual Care Services. Offered prayer and assurance of continued prayers on patient's behalf. Chart reviewed. The following outcomes where achieved:  Patient shared limited information about both their medical narrative and spiritual journey/beliefs.  confirmed Patient's Yazidism Affiliation. Patient processed feeling about current hospitalization. Patient expressed gratitude for 's visit. Assessment:  Patient does not have any Yazidism/cultural needs that will affect patients preferences in health care. There are no spiritual or Yazidism issues which require intervention at this time. Plan:  Chaplains will continue to follow and will provide pastoral care on an as needed/requested basis.  recommends bedside caregivers page  on duty if patient shows signs of acute spiritual or emotional distress. Chaplain Maritza SILVA  [address] [facility]  [phone]

## 2017-06-02 NOTE — DISCHARGE SUMMARY
[address]  [address] [place] [place]naldo [place]ila SUMMARY    Name:  Miguel Freitas  MR#:  [id number]  :  1935  Account #:  [de-identified]  Date of Adm:  2017  Date of Discharge:  2017      PRIMARY CARE DOCTOR: Gertrude Tierney MD    DISPOSITION: Discharged home with hospice care. DISCHARGE CONDITION: Fair. DISCHARGE DIAGNOSES  1. Failure to thrive. 2. Metastatic adrenal cancer, possible endometrial primary. 3. Mild dementia. 4. Acute renal failure. 5. Dyslipidemia. 6. Hypertension. 7. Dehydration. 8. Anemia. 9. Leukocytosis. 10. Nausea and vomiting, resolved now. 11. Moderate protein-calorie malnutrition. DISCHARGE MEDICATIONS  1. Lorazepam 1 mg every 4 hours p.r.n.  2. Morphine 5 mg every 2 hours p.r.n.  3. Zofran 4 mg every 6 hours p.r.n.  4. Multivitamin 1 tablet daily. [facility]  1. Consultation with Palliative Care. 2. Consultation with Dr. Chana Ariza, Oncology. HOSPITAL COURSE: This is an 77-year-old [place] American female  with a recent diagnosis of metastatic adenoid cancer with possible  endometrial has the primary, admitted to the hospital for evaluation of  failure to thrive, dehydration. The patient was admitted to the hospital,  started on IV fluids and oncology was consulted and Palliative Care  was also consulted. Oncology saw the patient and thought that the  patient most likely is a poor candidate for any chemotherapy,  recommended palliative and hospice consultation. Oncology discussed  with the patient and family, very detailed, and the patient and family  accepted palliative care. Palliative Care saw the patient and discussed  with the patient and family and the family decided they want to go with  [facility]-NOT-[facility]USCIT[facility] and hospice. I personally discussed with Ms.  Isac Gutierrez, the patient's niece, over the phone about the patient's current  condition and further plan of care.  The niece, Isac Gutierrez, is completely  aware about her poor and guarded prognosis and family, including the  patient's  and her niece and her daughters have decided  about hospice is currently working to set up the components at the  home. I have discussed with the family, they are willing for the patient  to go back to home as soon as it is completely set up. The patient will  be discharged back home once they complete the setup today. Discharge instructions, followup appointments and physical exam,  please refer to the electronic medical records. The patient is alert, awake, oriented x3. I discussed with the patient  about the discharge plan to home with hospice today and also  discussed with niece, Mariaa Sloan, over the phone. Both of them completely  understood and agreed with the plan. I also answered all their  questions and concerns appropriately.         Phani Copeland MD BT / FRANCY  D:  06/02/2017   12:16  T:  06/02/2017   14:38  Job #:  [id number]

## 2017-06-02 NOTE — PROGRESS NOTES
Bedside and Verbal shift change report given to  Bartolo Davonte [address] (oncoming nurse) by Bobo Rodriguez RN (offgoing nurse). Report included the following information SBAR, Kardex, MAR and Recent Results. SITUATION:    Code Status: DNR  Reason for Admission: dehydration.    Failure to thrive in adult    [facility] day: 0   Problem List:       Hospital Problems  Date Reviewed: 6/1/2017          Codes Class Noted POA    Altered mental status, unspecified ICD-10-CM: R41.82  ICD-9-CM: 780.97  6/2/2017 Unknown        Debility ICD-10-CM: R53.81  ICD-9-CM: 799.3  6/2/2017 Unknown        * (Principal)Failure to thrive in adult ICD-10-CM: R62.7  ICD-9-CM: 783.7  6/1/2017 Unknown        Weight loss ICD-10-CM: R63.4  ICD-9-CM: 783.21  6/1/2017 Unknown        Adenocarcinoma, metastatic ([facility] ICD-10-CM: C79.9  ICD-9-CM: 199.1  6/1/2017 Unknown        Dehydration ICD-10-CM: E86.0  ICD-9-CM: 276.51  6/1/2017 Unknown        HTN (hypertension) ICD-10-CM: I10  ICD-9-CM: 401.9  6/1/2017 Unknown        HLD (hyperlipidemia) ICD-10-CM: E78.5  ICD-9-CM: 272.4  6/1/2017 Unknown        ARLEEN (acute kidney injury) ([facility] ICD-10-CM: N17.9  ICD-9-CM: 584.9  6/1/2017 Unknown        Dementia ICD-10-CM: F03.90  ICD-9-CM: 294.20  6/1/2017 Unknown        Nausea & vomiting ICD-10-CM: R11.2  ICD-9-CM: 787.01  6/1/2017 Unknown              BACKGROUND:    Past Medical History:   Past Medical History:   Diagnosis Date    Hypertension     Ill-defined condition     elevated cholesterol         Patient taking anticoagulants yes     ASSESSMENT:    Changes in Assessment Throughout Shift: none     Patient has Central Line: no Reasons if yes: n/a   Patient has Taylor Cath: no Reasons if yes: n/a      Last Vitals:     Vitals:    06/01/17 1940 06/01/17 2000 06/02/17 0028 06/02/17 0439   BP:  106/69 105/69 119/65   Pulse:  88 86 (!) 101   Resp:  17 18 17   Temp:  98.7 °F (37.1 °C) 98.2 °F (36.8 °C) 97 °F (36.1 °C)   SpO2:   95% 93%   Weight:       Height: 5' 6\" (1.676 m)           IV and DRAINS (will only show if present)   Peripheral IV 05/31/17 Right Hand-Site Assessment: Clean, dry, & intact     WOUND (if present)   Wound Type:  none   Dressing present Dressing Present : Yes   Wound Concerns/Notes:  none     PAIN    Pain Assessment    Pain Intensity 1: 0 (06/02/17 0515)              Patient Stated Pain Goal: 0  o Interventions for Pain:  See MAR  o Intervention effective: yes  o Time of last intervention: none   o Reassessment Completed: yes      Last 3 Weights:  Last 3 Recorded Weights in this Encounter    05/31/17 1947 06/01/17 0631   Weight: 56.2 kg (124 lb) 59.9 kg (132 lb)     Weight change:      INTAKE/OUPUT    Current Shift:      Last three shifts: 05/31 1901 - 06/02 0700  In: 390 [P.O.:390]  Out: 400 [Urine:400]     LAB RESULTS     Recent Labs      06/02/17   0407  05/31/17   2112   WBC  10.5  13.3*   HGB  8.8*  11.1*   HCT  26.2*  31.9*   PLT  119*  108*        Recent Labs      06/02/17   0407  06/01/17   1005  05/31/17   2112   NA   --   137  135*   K   --   4.4  4.8   GLU   --   134*  95   BUN   --   36*  36*   CREA   --   1.37*  1.32*   CA   --   10.1  10.9*   MG   --    --   2.4   INR  1.4*   --   1.3*       RECOMMENDATIONS AND DISCHARGE PLANNING     1. Pending tests/procedures/ Plan of Care or Other Needs: none     2. Discharge plan for patient and Needs/Barriers: home    3. Estimated Discharge Date: 6/3/17 Posted on Whiteboard in [address] [place] [address] Room: yes      4. The patient's care plan was reviewed with the oncoming nurse. \"HEALS\" SAFETY CHECK      Fall Risk    Total Score: 1    Safety Measures: Safety Measures: Bed/Chair-Wheels locked, Bed in low position, Call light within reach    A safety check occurred in the patient's room between off going nurse and oncoming nurse listed above.     The safety check included the below items  Area Items   H  High Alert Medications - Verify all high alert medication drips (heparin, PCA, etc.)   E  Equipment - Suction is set up for ALL patients (with cierra)  - Red plugs utilized for all equipment (IV pumps, etc.)  - WOWs wiped down at end of shift.  - Room stocked with oxygen, suction, and other unit-specific supplies   A  Alarms - Bed alarm is set for fall risk patients  - Ensure chair alarm is in place and activated if patient is up in a chair   L  Lines - Check IV for any infiltration  - Taylor bag is empty if patient has a Taylor   - Tubing and IV bags are labeled   S  Safety   - Room is clean, patient is clean, and equipment is clean. - Hallways are clear from equipment besides carts. - Fall bracelet on for fall risk patients  - Ensure room is clear and free of clutter  - Suction is set up for ALL patients (with cierra)  - Hallways are clear from equipment besides carts.    - Isolation precautions followed, supplies available outside room, sign posted     Ana Paula Helton RN

## 2017-06-02 NOTE — PROGRESS NOTES
Follow up with patient, no family at bedside. No complaints per nursing report. Zofran ODT written for discharge when appropriate.

## 2017-06-02 NOTE — PROGRESS NOTES
Spoke with patients' nurse, Liban Robles, and she reported that the patient is going home on hospice. Will discontinue OT order. Thank you.     Nadine Martinez OTR/L

## 2017-06-02 NOTE — PROGRESS NOTES
PT spoke with RN Kevin Francois who confirms that patient is being discharged home with hospice. Will D/C PT eval order per protocol. Please re-order if further skilled needs arise. Thank you for this referral. Mati Carlton, PT, DPT.

## 2017-06-02 NOTE — DISCHARGE SUMMARY
Discharge Summary    Patient: Kalee Lowery MRN: [id number]  CSN: [id number]    YOB: 1935  Age: 80 y.o. Sex: female    DOA: 5/31/2017 LOS:  LOS: 0 days   Discharge Date:      Admission Diagnoses: dehydration. Failure to thrive in adult    Discharge Diagnoses:  PLEASE SEE DICTATION. Discharge Condition: Stable    PHYSICAL EXAM  Visit Vitals    /71 (BP 1 Location: Right arm, BP Patient Position: At rest)    Pulse 93    Temp 99.2 °F (37.3 °C)    Resp 18    Ht 5' 6\" (1.676 m)    Wt 59.9 kg (132 lb)    SpO2 96%    BMI 21.31 kg/m2       General: Alert, cooperative, no acute distress.      Lungs:  CTA Bilaterally. No Wheezing/Rhonchi/Rales. Heart:  Regular rate and Rhythm. Abdomen: Soft, Non distended, Non tender. + Bowel sounds. Extremities: No edema/ cyanosis/ clubbing  Neurologic:  AA oriented X 2-3. Moves all extremities. Hospital Course: Please see dictation. code # [id number]. Discharge Medications:     Current Discharge Medication List      START taking these medications    Details   ondansetron (ZOFRAN ODT) 4 mg disintegrating tablet Take 1 Tab by mouth every six (6) hours as needed for Nausea. Qty: 30 Tab, Refills: 0      therapeutic multivitamin (THERAGRAN) tablet Take 1 Tab by mouth daily. Qty: 30 Tab, Refills: 0      morphine (ROXANOL) 100 mg/5 mL (20 mg/mL) concentrated solution Take 0.25 mL by mouth every two (2) hours as needed for Pain (pain or shortness of breath). Max Daily Amount: 60 mg.  Qty: 30 mL, Refills: 0      LORazepam (LORAZEPAM INTENSOL) 2 mg/mL concentrated solution Take 0.5 mL by mouth every four (4) hours as needed for Agitation or Anxiety (oral or sublingual). Max Daily Amount: 6 mg.   Qty: 30 mL, Refills: 0         STOP taking these medications       rosuvastatin (CRESTOR) 10 mg tablet Comments:   Reason for Stopping:         telmisartan (MICARDIS) 20 mg tablet Comments:   Reason for Stopping:             · It is important that you take the medication exactly as they are prescribed. · Keep your medication in the bottles provided by the pharmacist and keep a list of the medication names, dosages, and times to be taken in your wallet. · Do not take other medications without consulting your doctor. DIET:  Regular Diet    ACTIVITY: Activity as tolerated  Home health care for hospice care     ADDITIONAL INFORMATION: If you experience any of the following symptoms but not limited to Fever, chills, nausea, vomiting, diarrhea, change in mentation, falling, bleeding, shortness of breath, chest pain, please call your primary care physician or return to the emergency room if you cannot get hold of your doctor:     FOLLOW UP CARE:  Dr. Manisha Grimes MD as needed.     Minutes spent on discharge: 40 minutes spent coordinating this discharge (review instructions/follow-up, prescriptions, preparing report for sign off)    Tyrell Jaramillo MD  6/2/2017 11:55 AM